# Patient Record
Sex: FEMALE | Race: OTHER | Employment: UNEMPLOYED | URBAN - METROPOLITAN AREA
[De-identification: names, ages, dates, MRNs, and addresses within clinical notes are randomized per-mention and may not be internally consistent; named-entity substitution may affect disease eponyms.]

---

## 2017-04-13 PROBLEM — E66.09 NON MORBID OBESITY DUE TO EXCESS CALORIES: Status: ACTIVE | Noted: 2017-04-13

## 2017-04-13 PROBLEM — F41.0 PANIC ATTACK: Status: ACTIVE | Noted: 2017-04-13

## 2017-04-13 PROBLEM — F41.8 ANXIETY WITH DEPRESSION: Status: ACTIVE | Noted: 2017-04-13

## 2017-04-13 PROBLEM — L25.9 CONTACT DERMATITIS, UNSPECIFIED CONTACT DERMATITIS TYPE, UNSPECIFIED TRIGGER: Status: ACTIVE | Noted: 2017-04-13

## 2017-04-14 PROBLEM — R79.89 ABNORMAL LFTS: Status: ACTIVE | Noted: 2017-04-14

## 2017-04-14 PROBLEM — E55.9 VITAMIN D DEFICIENCY: Status: ACTIVE | Noted: 2017-04-14

## 2020-04-07 PROBLEM — R87.612 PAPANICOLAOU SMEAR OF CERVIX WITH LOW GRADE SQUAMOUS INTRAEPITHELIAL LESION (LGSIL): Status: ACTIVE | Noted: 2020-04-07

## 2020-04-07 PROBLEM — O99.210 OBESITY AFFECTING PREGNANCY, ANTEPARTUM (HCC): Status: ACTIVE | Noted: 2020-04-07

## 2020-05-16 PROBLEM — Z34.90 PREGNANCY (HCC): Status: ACTIVE | Noted: 2020-05-16

## 2020-05-16 NOTE — PROGRESS NOTES
Pt is a 23year old female admitted to TRG4/TRG4-A, Patient presents with:  R/o Rom: Pt reports she notices leaking throughout the day. Pt is 39w0d intra-uterine pregnancy. Reports +fetal movement. History obtained, consents signed.  Oriented to room,

## 2020-05-16 NOTE — H&P
[address] and Delivery Prenatal History and Physical Interval Addendum  Please see full Prenatal Record for this pregnancy      SUBJECTIVE:    Interval History:      This is a pregnancy at 39 weeks admitted for SROM    OBJECTIVE:    The patient

## 2020-05-17 NOTE — PLAN OF CARE
Problem: Patient/Family Goals  Goal: Patient/Family Long Term Goal  Description  Patient's Long Term GoaL: Adequate pain relief    Interventions:  -  - See additional Care Plan goals for specific interventions   Outcome: Progressing  Goal: Patient/Family

## 2020-05-17 NOTE — L&D DELIVERY NOTE
Sherwin Diaz [[id number]]    Labor Events    Rupture date/time:  2020     Rupture type:  SROM  Fluid color:  Clear     Lacerations    No data filed      Schurz Presentation    No data filed      Operative Delivery    No data filed      Shoulder D viable male    Procedure:   Surgeon:Darlene  Anesthesia: epidural  Episiotomy or Laceration: none? Placenta and Cord:   Mechanism: spontaneous  Description: intact  Estimated Blood Loss: 300  Condition: stable      Patient presented with SROM.  Hernan Carrasco

## 2020-05-17 NOTE — PROGRESS NOTES
Patient  Getting a little more uncomfortable  SVE 2-3/80/-2 forebag ruptured   ContinuePitocin augmentation for SROM, epidural when ready

## 2020-05-17 NOTE — CM/SW NOTE
SW received an order for hx of postpartum and hx. Pt also has a hx of xanax and marijuana use. Informed RN she should consult the psych liaison. Will have BROCK BRYANT follow up tomorrow as pt is not being discharged.

## 2020-05-17 NOTE — ANESTHESIA PREPROCEDURE EVALUATION
PRE-OP EVALUATION    Patient Name: Torie Shea    Pre-op Diagnosis: * No pre-op diagnosis entered *    * No procedures listed *    * No surgeons found in log *    Pre-op vitals reviewed.   Temp: 97.9 °F (36.6 °C)  Pulse: 98  Resp: 18  BP: 123/85     Body Cardiovascular        Exercise tolerance: good     MET: >4    (+) obesity                                       Endo/Other                                  Pulmonary                           Neuro/Psych      (+) depression  (+) a

## 2020-05-17 NOTE — PROGRESS NOTES
Patient unable to void at this time. Patient transferred to mother/baby room 1112 per wheelchair in stable condition with baby and personal belongings. Accompanied by significant other and staff. Report given to mother/baby RN.

## 2020-05-17 NOTE — ANESTHESIA PROCEDURE NOTES
Labor Analgesia  Performed by: Matheus Webber MD  Authorized by: Matheus Webber MD       General Information and Staff    Start Time:  5/16/2020 9:13 PM  End Time:  5/16/2020 9:34 PM  Anesthesiologist:  Matheus Webber MD  Performed by:   Anesthesiolog

## 2020-05-17 NOTE — PROGRESS NOTES
[facility]  Post-Partum Vaginal Delivery Progress Note    Torie Shea Patient Status:  Inpatient    1995 MRN [id number]   [facility] 1SW-J Attending Carl Colon MD   Hosp Day # 1 PCP Mingo Proctor MD     SUBJECTIVE:

## 2020-05-18 NOTE — PROGRESS NOTES
Reviewed discharge instructions and resources available after discharge home,pt has a breast pump for home use, reviewed online support group for nurturing moms with clinical psychologist, Nicole Freitas, questions answered. Verbalized understanding.  Pt stat

## 2020-05-18 NOTE — CM/SW NOTE
SW spoke w/pt to address order. Pt stated she is not currently experiencing any anxiety or depression. Pt stated she was on anxiety medication but stopped taking it in January.  Pt stated she feels she has good family support and has everything she needs at

## 2020-05-18 NOTE — PROGRESS NOTES
[facility]  Post-Partum Vaginal Delivery Progress Note    Amalia Price Patient Status:  Inpatient    1995 MRN [id number]   [facility] 1SW-J Attending Jason Colon MD   Hosp Day # 2 PCP May Rosales MD     SUBJECTIVE:

## 2020-05-18 NOTE — PROGRESS NOTES
Pt has a hx of anxiety and depression and was given a Rx for xanax to use prn. Pt stated that she took medication only a few times. Pt stated that she is not seeing a psychologist for hx of anxiety and depression.  Discussed nurturing moms online support gr

## 2020-05-18 NOTE — PAYOR COMM NOTE
--------------  ADMISSION REVIEW     Payor: Ubaldo Dubois #:  [id number]  Authorization Number: [id number]         H&P - H&P Note             [address] and Delivery Prenatal History and Physical Interval hemostasis with Pitocin added to the IV and uterine massage.  Visual inspection revealed   Intact perineaum  The patient and infant were stable in the LDR  Britt BARLOW [facility]  5/17/2020  12:14 AM             5/17 OBGYN     Postpartum Day 1/2 s/p vaginal delive DAY:  docusate sodium (COLACE) cap 100 mg     Date Action Dose Route User    5/18/2020 0602 Given 100 mg Oral Dajuan Posada, RN    5/17/2020 1356 Given 100 mg Oral Tamy Koenig, RN      ibuprofen (MOTRIN) tab 600 mg     Date Action Dose Route User

## 2020-05-18 NOTE — PROGRESS NOTES
Labor Analgesia Follow Up Note    Patient underwent epidural anesthesia for labor analgesia,    Placenta Date/Time: 5/17/2020 12:07 AM    Delivery Date/Time[de-identified] 5/17/2020  12:04 AM    /82   Pulse 82   Temp 98.6 °F (37 °C) (Oral)   Resp 16   Wt 115.7 kg

## 2020-05-21 NOTE — PROGRESS NOTES
Reviewed self and infant care w / mom, she verbalizes understanding of instructions reviewed. Encourage to follow up w/ MDs as directed and w/ questions/concerns.  Hx of anx and depression, denies at this time but care reviweed and cc letter sent via my krishna

## 2020-12-03 PROBLEM — O99.210 OBESITY AFFECTING PREGNANCY, ANTEPARTUM (HCC): Status: RESOLVED | Noted: 2020-04-07 | Resolved: 2020-12-03

## 2020-12-03 PROBLEM — Z34.90 PREGNANCY (HCC): Status: RESOLVED | Noted: 2020-05-16 | Resolved: 2020-12-03

## 2023-06-03 NOTE — ED INITIAL ASSESSMENT (HPI)
Reports vag spotting for past 8 days with lower abd pain. On day 3 she took a preg test and today took another one and they were both negative. Was instructed to come to er by obgyn. Last tylenol for pain was thur.

## 2023-10-16 NOTE — TELEPHONE ENCOUNTER
Left message for patient to call and schedule appointment with the office, insurance information okay .

## 2023-10-24 NOTE — TELEPHONE ENCOUNTER
C/o bring pink spotting and cramps. New OB has not been seen in office advised to go to Immediate care. Agreed on POC. Patient verbalized understanding, agreed to and intend to comply with plan of care.

## 2023-10-24 NOTE — TELEPHONE ENCOUNTER
New pt, appt offered today to be seen in office. Pt request Friday appt. Appt 1015. Patient verbalized understanding, agreed to and intend to comply with plan of care.

## 2023-10-24 NOTE — ED INITIAL ASSESSMENT (HPI)
Pt states is 9 weeks pregnant, this morning noted discharge with spotting afterwards, also c/o lower abdominal cramping.  .

## 2023-10-27 NOTE — PROGRESS NOTES
Sergei Delaney is a 34year old female  Patient's last menstrual period was 2023 (approximate). Patient presents with:  New Patient  Ob Problem: Early pregnancy spotting, brown discharge on Tuesday, bleeding slowed, ER performed US and she is possibly miscarrying   . The ED US reports the following:   gestational sac with small fetal pole identified with mean gestational age by ultrasound at 6 weeks,  2 days +/- 3 days with expected delivery date of 2024 . No fetal heart tones are identified on current examination likely secondary to   size of fetal pole.   Ultrasound dates are discordant with gestational age by LMP of 9 weeks 0 days     OBSTETRICS HISTORY:  OB History    Para Term  AB Living   3 2 2     2   SAB IAB Ectopic Multiple Live Births           2      # Outcome Date GA Lbr Odell/2nd Weight Sex Delivery Anes PTL Lv   3 Current            2 Term 20 39w1d / 00:09 7 lb 3 oz (3.26 kg) M NORMAL SPONT EPI N RICH   1 Term 03/13/15   8 lb (3.629 kg) M Vag-Spont   RICH       GYNE HISTORY:    Sexual activity:   Yes      Partners:   Male                 MEDICAL HISTORY:  Past Medical History:   Diagnosis Date    Anxiety     Depression     LGSIL on Pap smear of cervix     Smoker        SURGICAL HISTORY:  Past Surgical History:   Procedure Laterality Date    Removal gallbladder         SOCIAL HISTORY:  Social History    Socioeconomic History      Marital status:       Spouse name: Not on file      Number of children: Not on file      Years of education: Not on file      Highest education level: Not on file    Occupational History      Not on file    Tobacco Use      Smoking status: Never      Smokeless tobacco: Never    Vaping Use      Vaping Use: Never used    Substance and Sexual Activity      Alcohol use: Not Currently        Comment: Not while pregnant       Drug use: Not Currently        Types: Cannabis        Comment: Former- 2019       Sexual activity: Yes Partners: Male    Other Topics      Concerns:        Not on file    Social History Narrative      Not on file    Social Determinants of Health  Financial Resource Strain: Not on file  Food Insecurity: Not on file  Transportation Needs: Not on file  Stress: Not on file  Housing Stability: Not on file    FAMILY HISTORY:  Family History   Problem Relation Age of Onset    Hypertension Father     Ovarian Cancer Mother     Diabetes Paternal Grandmother     Diabetes Paternal Grandfather     Asthma Sister        MEDICATIONS:    Current Outpatient Medications:     Prenatal Multivit-Min-Fe-FA (PRENATAL VITAMINS OR), Take by mouth., Disp: , Rfl:     ALLERGIES:    Dander                  HIVES    Comment:Cat and Dog Dander  Shrimp                  HIVES, WHEEZING      PHYSICAL EXAM:   Pelvic Exam:  External Genitalia: normal appearance, hair distribution, and no lesions  Urethral Meatus:  normal in size, location, without lesions and prolapse  Bladder:  No fullness, masses or tenderness  Vagina:  Normal appearance without lesions, no abnormal discharge, no blood pooling in vagina   Cervix:  Normal without tenderness on motion, no bleeding noted from cervix   Uterus: normal in size, contour, position, mobility, without tenderness  Adnexa: normal without masses or tenderness  Perineum: normal  Anus: no hemorroids     Assessment & Plan:  1. Bleeding in early pregnancy  - HCG, Beta Subunit, Quant; Future  - Progesterone; Future  - US OB 1st Trimester Abdominal <14 wks [42192];  Future      Discussed with patient to have US 7-10 days from previous

## 2023-11-02 NOTE — PROGRESS NOTES
Initial OB 7w3d    CELESTINO by 7w3d US does not correlate with LMP     OBHx:    , 8lb 0oz     7lb 3oz   -per patient no complications  PMHx; denies hepatitis, blood transfusion, HSV, or VTE  Last Pap 2023 normal results per pt -done at outside facility   PSHx: - cholecystectomy     Genetic screening discussed   -NIPT & Carrier - will consider next visit. Obesity (BMI 45)  -limit wt gain 11-20 lbs  -healthy eating and exercise discussed  -will plan L2US, growth US, and NSTs   -early 1 hr GTT, A1c,  CMP, uric acid - added to PNL       Vaginal spotting since 10/24/2023.  -progesterone 6.12 on 10/27  -is taking Prometrium 200 mg vaginally   -will plan to discontinue at 12 weeks    Constipation   -discussed stool softeners   -drink plenty of water, high fiber diet.

## 2023-11-28 NOTE — PROGRESS NOTES
ERIN - 11w1d     Pt still feeling pretty nauseous, food aversions, not too much vomiting. Dealing with it. Discussed unisom/B6, call if wants rx for antiemetic    CELESTINO by 7w3d US not consistent with LMP   -NIPT - wants, drawn  -carrier screen - wants, drawn    Obesity (BMI 45)  -limit wt gain 11-20 lbs  -healthy eating and exercise discussed  -will plan L2US, growth US, and NSTs   -early 1 hr GTT, A1c,  CMP, uric acid - added to PNL - reminded to have drawn      Constipation   -discussed stool softeners   -drink plenty of water, high fiber diet.

## 2023-11-28 NOTE — TELEPHONE ENCOUNTER
6  [place] pregnancy    Pt request for NIPS/carrier lab draw per Dr. April Woods     Patients name &  verified on lab tubes with patient. NIPS/carrier screen lab drawn, patient tolerated well. Specimen placed at . INVITAE access, call in 2/4 weeks for result, Cautioned patient may receive results via email from [facility] that includes gender results discussed. Patient verbalized understanding.

## 2023-12-26 PROBLEM — Z34.82 PRENATAL CARE, SUBSEQUENT PREGNANCY, SECOND TRIMESTER (HCC): Status: ACTIVE | Noted: 2023-12-26

## 2023-12-26 PROBLEM — O99.212 MATERNAL MORBID OBESITY IN SECOND TRIMESTER, ANTEPARTUM (HCC): Status: ACTIVE | Noted: 2023-12-26

## 2023-12-26 PROBLEM — O99.342 ANXIETY DURING PREGNANCY IN SECOND TRIMESTER, ANTEPARTUM (HCC): Status: ACTIVE | Noted: 2023-12-26

## 2023-12-26 PROBLEM — E66.01 MATERNAL MORBID OBESITY IN SECOND TRIMESTER, ANTEPARTUM (HCC): Status: ACTIVE | Noted: 2023-12-26

## 2023-12-26 PROBLEM — F41.9 ANXIETY DURING PREGNANCY IN SECOND TRIMESTER, ANTEPARTUM (HCC): Status: ACTIVE | Noted: 2023-12-26

## 2023-12-26 PROBLEM — O21.9 NAUSEA/VOMITING IN PREGNANCY (HCC): Status: ACTIVE | Noted: 2023-12-26

## 2023-12-26 PROBLEM — F43.9 STRESS: Status: ACTIVE | Noted: 2023-12-26

## 2023-12-26 NOTE — PROGRESS NOTES
ERIN     Partner here     Smoothies - strawberry, banana, spinach & kale, pineapple help with the nausea. Has NOT been adding protein powder   Not really eating solid food     The nausea is better. Vomited only 2 times last week. +FM. Occasional low stretching/pulling. Did slip and fell on some carpeted stairs on 23. Missed a step. Did not hurt self. 34year old  at 15w1d   CELESTINO by [address] not consistent with LMP   O+    -NIPS neg, GIRL   -carrier screen neg  -AFP desired. Ordered   -Flu  - 2023   - COVID booster encouraged     Morbid obesity (BMI 45)  -limit wt gain 11-20 lb discussed    -healthy eating and exercise discussed  -will plan L2US, growth US, and NSTs   -early 1 hr GTT, A1c, CMP, uric acid - added to PNL - reminded to have drawn. Not done yet. Anxiety   -meds: on & off since 1st baby born. Started in postpartum period. Took Xanax for panic attacks. Thinks citalopram, sertraline in the past. On & off. None for about 2 years  -therapist - used to see someone, but insurance changed. [address] referral   -some stressors in life, changes    NVP  -improving.  No meds  -encouraged adding in protein, at least  grams per day    Constipation   -improved with improving nausea, eating apples    RTC 4 wk

## 2023-12-26 NOTE — PATIENT INSTRUCTIONS
Please have labs done ASAP. Please schedule 20 wk ultrasound with Maternal Fetal Medicine (MFM)  Walter Del Cid   [facility]  [place] [address], [address]    [address] [place], [address] [facility], [address] [place] [address], [facility] [address]   [phone]       AFP Level   There is an optional blood test that we can perform on you called \"AFP level. \" It is a chemical in the bloodstream produced by the pregnancy. It is done between 15-20 weeks gestation. The ideal time for testing is actually 16-18 weeks gestation. If the level is abnormally elevated, this can indicate the presence of abnormalities of the development of the brain and spinal cord such as anencephaly (lack of brain development) and spina bifida (exposed spinal cord). These anomalies can generally be seen on ultrasound. It can also be elevated in cases of normal fetal anatomy and can indicate an abnormal placenta. This may or may not be able to be detected on ultrasound. Please think about whether or not you would like to have this test done. When you decide when you would like to have this test done, please let us know. We must enter your exact gestational age and weight on the date of the blood draw for the test to be calculated accurately. Controlling weight gain in pregnancy:  Look at current diet - write down everything you eat for a few days. Count up your mg protein, grams of fiber, etc.   Protein - aim for 75 grams per day. [email or web address]. Add (plain- no herbal additives, etc) protein powder if needed. Aim for 1 gallon of water daily   Fiber rich foods. 25-28 grams per day.    Heartburn medication if needed   Avoid fast/simple carbohydrates (sodas) - this can spike your insulin levels & can trigger hypoglycemia which can cause ravenous hunger  Adequate sleep important for hormonal regulation of appetite. Avoid high sodium foods. Aim for potassium-rich foods. These will have a slight diuretic effect. Walk for at least 15 minutes after every meal.     Basic sample meal plan:    Breakfast: 15-30 g carbs for breakfast  Mid morning snack (if hungry): 15 -30 g carb   Lunch: 45-60 g carb  Mid afternoon snack (if hungry): 15-30 g carb  Dinner: 45-60 g carb   Bedtime snack if hungry can be 15 g carb with some protein.   Snack should be between 8-10 pm

## 2024-01-04 NOTE — PROGRESS NOTES
ERIN, problem visit    Chief Complaint   Patient presents with    Prenatal Care     ERIN- 16w 3d    Patient reports having discharge that started 2 weeks ago that was bright green, Today patient reports it has decrease but still bright green.       Spec exam - slightly greenish tinged mucusy discharge - vaginosis swab & GC/CT done       28 year old  at 16w3d   CELESTINO by 7w3d US not consistent with LMP   O+    -NIPS neg, GIRL   -carrier screen neg  -AFP desired. Ordered   -Flu - - 2023   - COVID booster encouraged     Morbid obesity (BMI 45)  -limit wt gain 11-20 lb discussed    -healthy eating and exercise discussed  -L2US scheduled  -early 1 hr GTT, A1c, CMP, uric acid - added to PNL - reminded to have drawn, going later today  -growth US, NSTs per MFM    Anxiety   -meds: on & off since 1st baby born. Started in postpartum period. Took Xanax for panic attacks. Thinks citalopram, sertraline in the past. On & off. None for about 2 years  -therapist - used to see someone, but insurance changed.  referral   -some stressors in life, changes

## 2024-01-10 NOTE — TELEPHONE ENCOUNTER
Pt calling to speak to a nurse about her symptoms. Pt states she is 17wk pregnant and has had a migraine for 48 hours. Pt states she is now starting to feel dizzy too. Please advise.    Improved.

## 2024-01-10 NOTE — TELEPHONE ENCOUNTER
Reason 17 2/7 wk c/o migraine for 48 h,,now starting to feel dizzy & vomiting   Last seen  1/4/24   History  Lives with obesity & anxiety, antibody screen positive   Onset Migraine Yesterday   Assessment Cold s/s Runny nose started today, vomiting since 0600 x 3, denies hx migraine, allg. Starting to eat now. Headache is frontal radiates all the way to back of her head and neck,  - Took Tylenol ES 2 tablet.  Denies swelling, epigastric pain, neck issues.    Plan  Try Ice pack on her head, low stimuli, dark room, Tylenol 1000 mg with Benadryl 25 mg, Could be stress related, cold. If s/s persist to go to ER.   Follow up Appointment  1/23/24   Patient verbalized understanding, agreed to and intend to comply with plan of care.

## 2024-01-11 PROBLEM — D50.9 IRON DEFICIENCY ANEMIA DURING PREGNANCY (HCC): Status: ACTIVE | Noted: 2024-01-11

## 2024-01-11 PROBLEM — E87.6 HYPOKALEMIA: Status: ACTIVE | Noted: 2024-01-06

## 2024-01-11 PROBLEM — O99.019 IRON DEFICIENCY ANEMIA DURING PREGNANCY (HCC): Status: ACTIVE | Noted: 2024-01-11

## 2024-01-11 PROBLEM — O24.410 DIET CONTROLLED GESTATIONAL DIABETES MELLITUS (GDM) IN SECOND TRIMESTER (HCC): Status: ACTIVE | Noted: 2024-01-06

## 2024-01-11 PROBLEM — O24.410 DIET CONTROLLED GESTATIONAL DIABETES MELLITUS (GDM) IN SECOND TRIMESTER (HCC): Status: ACTIVE | Noted: 2024-01-11

## 2024-01-17 NOTE — TELEPHONE ENCOUNTER
1/18 LVM with pt to reschedule her GDM apt that patient canceled on 1/17 1/17 Pt canceled her gdm apt with cal on 1/17 LVM to reschedule apt.

## 2024-01-18 NOTE — TELEPHONE ENCOUNTER
1/18/2024 lvm to reschedule apt for gdm apt that patient has referral for  with cal that patient canceled

## 2024-01-23 NOTE — PROGRESS NOTES
ERIN 19w1d    She is doing well, no complaints.   -had appointment with DM educator due to on 1/31- A1c 5.7%.    CELESTINO by 7w3d US not consistent with LMP   O+     -NIPS neg, GIRL   -carrier screen neg  -AFP desired, ordered at prior visit   -Flu done  - 12/26/2023      Morbid obesity (BMI 45)  -limit wt gain 11-20 lb discussed    -healthy eating and exercise discussed  -will plan L2US, growth US, and NSTs   - CMP, uric acid - added to PNL - reminded to have drawn. Not done yet.     Early onset GDM   -at 17 weeks A1c 5.7%, 1 hr   - DM education recommended - has appointments scheduled.      Anxiety   -meds: on & off since 1st baby born. Started in postpartum period. Took Xanax for panic attacks. Thinks citalopram, sertraline in the past. On & off. None for about 2 years  -therapist - used to see someone, but insurance changed.  referral placed at prior visit

## 2024-01-30 PROBLEM — E66.01 MORBID OBESITY WITH BMI OF 45.0-49.9, ADULT (HCC): Status: ACTIVE | Noted: 2023-12-26

## 2024-01-30 NOTE — PROGRESS NOTES
Outpatient Maternal-Fetal Medicine Consultation    Dear Dr. Kelley,    Thank you for requesting ultrasound evaluation and maternal fetal medicine consultation on your patient Tatiana Voss.  As you are aware she is a 28 year old female with a Fitch pregnancy at 20w1d.  A maternal-fetal medicine consultation was requested secondary to maternal morbid obesity complicating pregnancy and mildly elevated hemoglobin A1c.  Her prenatal records and labs were reviewed.    Her prenatal labs were reviewed.  Her TSH and CMP were unremarkable for pregnancy.  Her hemoglobin A1c was 5.7%.  She passed a 1 hour glucose tolerance test.  Her TSH was within normal limits.  Her random urine protein creatinine ratio 2 little protein to calculate.  She had a low risk maternal serum AFP for open neural tube defect.  She also had a low probability cell free DNA screen.    HISTORY  OB History    Para Term  AB Living   3 2 2 0 0 2   SAB IAB Ectopic Multiple Live Births   0 0 0 0 2   Obstetric Comments   Current - morbid obesity, early onset GDM dx 16 wk, h/o elevated LFTs (baseline LFTs normal at 16 wk), anxiety, depression, panic attacks      # 1 - Date: 03/13/15, Sex: Male, Weight: 8 lb (3.629 kg), GA: None, Delivery: Vaginal, Spontaneous, Apgar1: None, Apgar5: None, Living: Living, Birth Comments: None    # 2 - Date: 20, Sex: Male, Weight: 7 lb 3 oz (3.26 kg), GA: 39w1d, Delivery: Normal spontaneous vaginal delivery, Apgar1: 9, Apgar5: 9, Living: Living, Birth Comments: None    # 3 - Date: None, Sex: None, Weight: None, GA: None, Delivery: None, Apgar1: None, Apgar5: None, Living: None, Birth Comments: None    Past Medical History  The patient  has a past medical history of Abnormal LFTs (2017), Anemia (), Anxiety, Anxiety with depression (2017), Contact dermatitis, unspecified contact dermatitis type, unspecified trigger (2017), Depression, Gestational diabetes (2024), LGSIL on Pap  smear of cervix, Morbid obesity with BMI of 40.0-44.9, adult (HCC) (2023), Morbid obesity with BMI of 45.0-49.9, adult (HCC), Panic attack (04/13/2017), Screening for genetic disease carrier status (12/05/2023), Smoker, and Vitamin D deficiency (04/14/2017).    Past Surgical History  The patient  has a past surgical history that includes removal gallbladder (01/01/2012) and cholecystectomy (2012).    Family History  The patient She indicated that her mother is alive. She indicated that her father is alive. She indicated that the status of her sister is unknown. She indicated that the status of her paternal grandmother is unknown. She indicated that the status of her paternal grandfather is unknown. She indicated that the status of her neg is unknown. She indicated that her maternal aunt is alive.      Medications:   Current Outpatient Medications:     Ferrous Gluconate (IRON) 240 (27 Fe) MG Oral Tab, 3 (three) times a week., Disp: , Rfl:     Cholecalciferol (D3 VITAMIN OR), , Disp: , Rfl:     Magnesium 500 MG Oral Tab, Take by mouth daily., Disp: , Rfl:     Prenatal Multivit-Min-Fe-FA (PRENATAL VITAMINS OR), Take by mouth., Disp: , Rfl:     ergocalciferol 1.25 MG (30825 UT) Oral Cap, Take 1 capsule (50,000 Units total) by mouth As Directed., Disp: , Rfl:   Allergies:   Allergies   Allergen Reactions    Cat Hair Extract HIVES, Runny nose and UNKNOWN    Dander HIVES     Cat and Dog Dander    Shellfish Allergy ANXIETY, Coughing, OTHER (SEE COMMENTS), HIVES, RASH and SHORTNESS OF BREATH    Shrimp HIVES and WHEEZING    Shrimp Extract Allergy Skin Test ANAPHYLAXIS, HIVES and SHORTNESS OF BREATH         PHYSICAL EXAMINATION:  /71 (BP Location: Right arm, Patient Position: Sitting, Cuff Size: adult)   Pulse 88   Ht 5' 5.5\" (1.664 m)   Wt 268 lb (121.6 kg)   LMP 08/27/2023 (Approximate)   BMI 43.92 kg/m²   General: alert and oriented,no acute distress  Abdomen: gravid, soft, non-tender  Extremities: non-tender, no  edema      OBSTETRIC ULTRASOUND  The patient had a level 2 ultrasound today which I interpreted the results and reviewed them with the patient.    Ultrasound findings:   Single IUP in breech presentation.    Placenta is anterior.   A 3 vessel cord is noted.  Cardiac activity is present at 158 bpm   g ( 0 lb 14 oz);   MVP is 3.2 cm  Normal appearing uterus and adnexa.  Ovaries not visualized but no adnexal masses    The cervix was not able to be clearly visualized and appeared short by transabdominal ultrasound, therefore transvaginal ultrasound was performed. Transvaginal US findings: Cervix is closed, long and no funneling seen measuring 37.4 mm     The fetal measurements are consistent with the established EDC. No ultrasound evidence of structural abnormalities are seen today. The nasal bone is present. No ultrasound evidence of markers for aneuploidy are seen. She understands that ultrasound exam cannot exclude genetic abnormalities and that genetic testing is recommended. The limitations of ultrasound were discussed.    See imaging tab for the complete US report.    DISCUSSION  During her visit we discussed and reviewed the following issues:  OBESITY:  Her BMI prior to pregnancy was 45  Obesity during pregnancy is associated with numerous maternal and  risks.  It is not clear whether obesity is a direct cause of adverse pregnancy outcome or whether the association between obesity and adverse pregnancy outcome is due to factors such as diabetes mellitus.   Data suggest that obese women should be encouraged to undertake a weight reduction program (diet, exercise, behavior modification, and possibly bariatric surgery in some cases) prior to attempting to conceive.            Subfertility in obese women is most commonly related to ovulatory dysfunction, and, in some obese women, the ovulatory dysfunction is related to polycystic ovary syndrome (PCOS). It is also important to note that even among  ovulatory women, increasing obesity is associated with decreasing spontaneous pregnancy rates.  The increased risk of miscarriage in obese women may be because such women often have PCOS or isolated insulin resistance.                 Due to its strong association with obesity in the general population, type 2 diabetes mellitus is one of the two most common medical complications of the obese . The increased risk of type 2 diabetes is primarily related to an exaggerated increase in insulin resistance in the obese state. It is reasonable to screen obese gravidas for undiagnosed pregestational diabetes in the first trimester.   Glucose intolerance associated with gestational diabetes generally resolves postpartum; however, obese women with a history of gestational diabetes have a two-fold increased prevalence of subsequent type 2 diabetes.           An association between obesity and hypertensive disorders during pregnancy has been consistently reported.  In particular, maternal weight and BMI are independent risk factors for preeclampsia.             Studies have found that the increased risk of  birth in obese gravidas is primarily associated with obesity-related medical and  complications, rather than an intrinsic predisposition to spontaneous  birth. Prevention of  birth in these patients, therefore, should be directed toward prevention or management of medical and obstetrical complications.               Both prepregnancy obesity and excessive maternal weight gain before or during pregnancy contribute to an increased probability of  delivery.  It has also been hypothesized that obesity may lead to dystocia due to increased soft tissue deposition in the maternal pelvis.    delivery in the obese  is associated with numerous perioperative concerns, including emergency delivery, prolonged incision to delivery interval, blood loss >1000 mL, longer operative  times, wound infection, thromboembolism, and endometritis.            Maternal obesity appears to be associated with a small increase in the absolute rate of some congenital anomalies (primarily neural tube defect and cardiac), and the risk may increase with increasing maternal weight.  Level II ultrasound is advised for women with obesity.  The risk of neural tube defects increased significantly with maternal weight.    The analysis found that overweight and obese pregnant women experienced significantly more stillbirths than normal weight women.  Third trimester  testing is advised.    We discussed the current recommendations for limited gestational weight gain in pregnancy for overweight and obese women.  The [place] [employer] currently recommends that women keep gestational weight gain to between 8-18 lbs.  We discussed the role of mild to moderate exercise, healthy food choices and appropriate portions sized to help achieve this goal.  Excess weight gain is associated with higher rates of gestational diabetes, hypertensive complications, fetal macrosomia and delivery complications.  Women with weight loss or insufficient weight gain have higher rates of small for gestational age infants.    A recent study found that initiating moderate exercise in early pregnancy for obese gravidas significantly reduced the incidence of gestational diabetes, gestational hypertension,  deliveries and C-sections.  In the study, women were assigned to riding a stationary cycle for  30 minutes 3 times per week, keeping HR<140 bpm.  I encouraged Tatiana to begin moderate exercise such as walking or stationary bike in the pregnancy.    Obstructive sleep apnea (KENNY), which refers to apnea (absent or severely reduced airflow) during sleep despite respiratory effort. KENNY is characterized by repetitive partial or complete episodes of upper airway obstruction during sleep. The obstruction results in a reduction of  airflow, hypoxemia, sympathetic discharge, and recurrent arousals from sleep.     The numerous hormonal and physiologic changes occurring in pregnancy may play a role in the prevalence and severity of KENNY among pregnant women: oropharyngeal diameter appears to narrow, Nasal patency is reduced during pregnancy secondary to hyperemia and edema of the nasal mucosa, maternal blood volume increases, on average, 40 to 45 percent so recumbency may adversely affect upper airway function and possibly increase the likelihood of sleep-disordered breathing, and increase in progesterone levels during pregnancy leads to increased tidal volume and results in increased minute ventilation.    Sleep-disordered breathing has been proposed as a risk factor for adverse maternal-fetal outcomes, including preeclampsia and small-for-gestational age births. Individuals with KENNY have an increased risk of developing type II diabetes, hyperinsulinemia, and metabolic syndrome. There are no data on the effect of KENNY on risk of intrauterine fetal demise.    I would recommend treating all pregnant women with moderate or severe KENNY by apnea hypopnea  index (AHI) criteria. In the general population, therapies for KENNY include continuous positive airway pressure (CPAP), oral appliances, upper airway surgery, and behavior modification. CPAP is generally regarded as first-line therapy for pregnant women with KENNY. Weight loss, which has been demonstrated to improve comorbid conditions and decrease the overall AHI, is not recommended in pregnancy.  However, limiting weight gain may benefit an individual’s global risk for pregnancy related morbidity.    Women with known KENNY who become pregnant should be evaluated and followed by a sleep medicine provider.   The anesthesia service is consulted prenatally, but at least early in the patient’s labor. Early placement of regional anesthesia may prevent the need for general anesthesia if emergent   delivery becomes necessary. Women who have been using CPAP, another positive pressure modality, or an oral  appliance prior to delivery for KENNY are instructed to bring their devices when they present for delivery.    Patients with KENNY should have their oxygenation continuously monitored during labor with a pulse oximeter.      We discussed the recommended plan of care based on her  risk factors.  Tatiana and her significant other, Mati, had their questions answered to their satisfaction.      IMPRESSION:  IUP at 20w1d  Normal level 2 ultrasound; ductal arch view was slightly suboptimal but appears normal  Suspected short cervix not found, normal transvaginal cervical length  Maternal morbid obesity complicating pregnancy  Obstructive sleep apnea    RECOMMENDATIONS:  Continue care with Dr. Kelley  11-20 lb weight gain for pregnancy  Monthly growth ultrasounds starting at 28 weeks, add BPP to each growth ultrasound at 32 weeks and beyond  Weekly NSTs at 34 weeks  Delivery 39-40 weeks  Daily low-dose aspirin was advised    Total time spent 55 minutes this calendar day which includes preparing to see the patient including chart review, obtaining and/or reviewing additional medical history, performing a physical exam and evaluation, documenting clinical information in the electronic medical record, independently interpreting results, counseling the patient, communicating results to the patient/family/caregiver and coordinating care.     Case discussed with patient who demonstrated understanding and agreement with plan.     Thank you for allowing me to participate in the care of this patient.  Please feel free to contact me with any questions.    Maria Guadalupe Thakkar MD  Maternal-Fetal Medicine       Note to patient and family:  The 21st Century Cures Act makes medical notes available to patients in the interest of transparency.  However, please be advised that this is a medical document.  It is intended as a peer to  peer communication.  It is written in medical language and may contain abbreviations or verbiage that are technical and unfamiliar.  It may appear blunt or direct.  Medical documents are intended to carry relevant information, facts as evident, and the clinical opinion of the practitioner.

## 2024-01-31 NOTE — PROGRESS NOTES
Tatiana Voss  :1995 was seen for Gestational Diabetes Counseling: Individual/Group    Date: 2024  Referring Provider: Dr. 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 & jelly, to go jerky & cheese & skinny pop , string cheese   [place]: may be skinny pop or an apple   D: 5-6pm grd beef, chx breast, hot dogs, tomatoes potatoes, frozen veggies w/grd beef & white rice (avoids pasta )  Snack: mandarin oranges  Water, no milk ,no coffee, no sodas- sweet tea   1x/month      Exercise: none     Education:     GDM Overview:  Reviewed gestational diabetes as diagnosis including target blood glucose values.  Benefits, risks, and management options for improving/maintaining glucose control to mother/baby discussed.    Healthy Eating:  Discussed nutrition concepts for pregnancy/healthy eating and effects of food on BG value.  Timing of meals; what is a carbohydrate, protein, fat.  Taught: Carb counting, label reading, meal planning.  Suggested Calorie Level: 2000    Being Active:  Benefits and effects of activity on BG discussed.  Reviewed types of recommended activity, duration, precautions, and when to call MD.    Monitoring:  Instructed on how to use glucose monitor/proper lancet disposal. Testing schedules are:   Fastin-95 mg/dL, Call MD is >105 md/dL twice in 1 week   2 Hour Post Prandial:  120 md/dL, Call MD if >140 md/dL twice in 1 week.    Instructed /demonstrated ability to perform blood glucose testing on: Onetouch Verio Flex  Prescription sent to pharmacy  Discussed monitoring ketones.    Taking Medication:  Reviewed when  medication might be indicated.    Reducing Risk:  Effects of elevated blood glucose on mother/baby reviewed.  Discussed management (hyperglycemia, hypoglycemia, sick day, other) and when to call provider.  Post pregnancy management/prevention of Type 2 DM, and increased risk of having diabetes later in life reviewed.    Training Tools Provided:  [place] Diabetes and Pregnancy: A guide to self management  BG Log Sheets    Recommendations:      1. Follow recommended meal plan.   2. Begin testing fasting glucose and 2 hour after meals   3. Bring glucose / food log to next MD office visit.   4. Encouraged activity if no restrictions.   5. Encouraged Tatiana to call diabetes center with any questions or concerns.   6. If fasting glucose is above 105 mg/dL or after meal above 140 mg/dL for 2 days in a row please call your OB    7. Follow-up in 2 wks    Patient verbalized understanding and has no further questions at this time.    Magali Perez RN, [facility] [place]

## 2024-02-23 NOTE — PROGRESS NOTES
Patient has no compaitns.    CELESTINO by 7w3d US not consistent with LMP     -NIPS neg, GIRL   -carrier screen neg  -Flu done  - 12/26/2023      Morbid obesity (BMI 45)  -limit wt gain 11-20 lb discussed    -healthy eating and exercise discussed  - normal L2U, f/u 28 and 32 weeks  - NST 4 weeks  - CMP low potasium - corrected    Early onset GDM - soft call   -at 17 weeks A1c 5.7%, 1 hr   - DM education done, patient does not agree that she has DM since she passed 1GTT, not checking her blood sugars as it is an expense to get the machine, but she did adjust her diet  - patient will repeat 1 GTT before next visit     Anxiety   -meds: on & off since 1st baby born. Started in postpartum period. Took Xanax for panic attacks. Thinks citalopram, sertraline in the past. On & off. None for about 2 years  -therapist - used to see someone, but insurance changed.  referral placed at prior visit

## 2024-03-21 NOTE — PROGRESS NOTES
Denies LOF, VB, ctx/cramping  +FM  Feels more pressure and occasional B-H contractions    live  5 yo Elias       CELESTINO by 7w3d US not consistent with LMP     -NIPS neg, GIRL   -carrier screen neg  -Flu done  - 12/26/2023   -Tdap 3/21/24     Morbid obesity (BMI 45)  -limit wt gain 11-20 lb discussed    -healthy eating and exercise discussed  - normal L2U, f/u 28 and 32 weeks  - NST 4 weeks  - CMP low potasium - corrected    Does not have Early onset GDM   -at 17 weeks A1c 5.7%, 1 hr   - DM education done, patient does not agree that she has DM since she passed 1GTT, not checking her blood sugars as it is an expense to get the machine, but she did adjust her diet  - 1h GTT is n117    Anxiety   -meds: on & off since 1st baby born. Started in postpartum period. Took Xanax for panic attacks. Thinks citalopram, sertraline in the past. On & off. None for about 2 years  -therapist - used to see someone, but insurance changed.  referral placed at prior visit     Iron def anemia  #Anemia of Pregnancy  Iron every other day with beverage/food w vitamin C

## 2024-03-26 NOTE — PROGRESS NOTES
Outpatient Maternal-Fetal Medicine Follow-Up     Dear Dr. Kelley,     Thank you for requesting ultrasound evaluation and maternal fetal medicine consultation on your patient Tatiana Voss.  As you are aware she is a 26/27 year old female  with a Fitch pregnancy and an Estimated Date of Delivery: 24.  She returned to maternal-fetal medicine today for a follow-up visit.  Her history was reviewed from her prior visit and there were no interval changes.    She passed her third trimester GDM screen     Antepartum Risk Factors  Class III obesity  History of GDM  Obstructive sleep apnea    S: She reports good fetal movement.  She is exercising more regularly in this pregnancy.  She did not start the low-dose aspirin.  She did not think she was supposed to start that until 30 weeks.    PHYSICAL EXAMINATION:  /74 (BP Location: Right arm, Patient Position: Sitting, Cuff Size: large)   Pulse 102   Ht 5' 5.5\" (1.664 m)   Wt 263 lb (119.3 kg)   LMP 2023 (Approximate)   BMI 43.10 kg/m²   General: alert and oriented, no acute distress  Abdomen: gravid, soft, non-tender  Extremities: non-tender, no edema     OBSTETRIC ULTRASOUND  The patient had a fetal growth ultrasound today which I interpreted the results and reviewed them with the patient.    Ultrasound Findings:  Single IUP in cephalic presentation.    Placenta is anterior.   Cardiac activity is present at 155 bpm  EFW 1185 g ( 2 lb 10 oz); 52%.    MVP is 3.7 cm . DIONY 12.8 cm    The fetal measurements are consistent with established EDC. No gross ultrasound evidence of structural abnormalities are seen today. The patient understands that ultrasound cannot rule out all structural and chromosomal abnormalities.     See imaging tab for the complete US report.     DISCUSSION  During her visit we discussed and reviewed the following issues:  OBESITY:  Her BMI prior to pregnancy was 45  Obesity during pregnancy is associated with numerous  maternal and  risks which was discussed previously and reviewed.  See MFM note from 2024 for detailed review.     Obstructive sleep apnea (KENNY) and potential pregnancy complications were discussed previously and reviewed.  See M note from 2024 for detailed review.        We discussed the recommended plan of care based on her  risk factors.  Tatiana and her significant other, Mati, had their questions answered to their satisfaction.        IMPRESSION:  IUP at 28w1d  Normal fetal growth and activity  Maternal morbid obesity complicating pregnancy  Obstructive sleep apnea     RECOMMENDATIONS:  Continue care with Dr. Kelley  11-20 lb weight gain for pregnancy  Monthly growth & BPP ultrasound  Weekly NSTs at 34 weeks  Delivery 39-40 weeks  Daily low-dose aspirin was advised        Total time spent 30 minutes this calendar day which includes preparing to see the patient including chart review, obtaining and/or reviewing additional medical history, performing a physical exam and evaluation, documenting clinical information in the electronic medical record, independently interpreting results, counseling the patient, communicating results to the patient/family/caregiver and coordinating care.     Case discussed with patient who demonstrated understanding and agreement with plan.     Thank you for allowing me to participate in the care of this patient.  Please feel free to contact me with any questions.    Maria Guadalupe Thakkar MD  Maternal-Fetal Medicine       Note to patient and family:  The 21st Century Cures Act makes medical notes available to patients in the interest of transparency.  However, please be advised that this is a medical document.  It is intended as a peer to peer communication.  It is written in medical language and may contain abbreviations or verbiage that are technical and unfamiliar.  It may appear blunt or direct.  Medical documents are intended to carry relevant information,  facts as evident, and the clinical opinion of the practitioner.

## 2024-04-11 NOTE — PROGRESS NOTES
ERIN 30w3d    She is feeling abdominal heaviness - reassured patient, belly band discussed.       CELESTINO by 7w3d US not consistent with LMP      -NIPS neg, GIRL   -carrier screen neg  -Flu done  - 12/26/2023   -Tdap 3/21/24     Morbid obesity (BMI 45)  -limit wt gain 11-20 lb discussed    -healthy eating and exercise discussed  - normal L2U, f/u 28 and 32 weeks      28 wk EFW 1185 g (2 lb 10 oz) 52%  - NST 34 weeks  - CMP low potasium - corrected  -Delivery at 39-40 wks  -Daily ASA advised - is not currently taking - encouraged      Does not have Early onset GDM   -at 17 weeks A1c 5.7%, 1 hr   - DM education done, patient does not agree that she has DM since she passed 1GTT, not checking her blood sugars as it is an expense to get the machine, but she did adjust her diet  - 3/16/24 1 hr GTT -117     Anxiety   -meds: on & off since 1st baby born. Started in postpartum period. Took Xanax for panic attacks. Thinks citalopram, sertraline in the past. On & off. None for about 2 years  -therapist - used to see someone, but insurance changed.  referral placed at prior visit      Iron def anemia  Iron every other day with beverage/food w vitamin C- Is doing

## 2024-04-23 NOTE — PROGRESS NOTES
Outpatient Maternal-Fetal Medicine Follow-Up     Dear Dr. Kelley,     Thank you for requesting ultrasound evaluation and maternal fetal medicine consultation on your patient Tatiana Voss.  As you are aware she is a 26/27 year old female  with a Fitch pregnancy and an Estimated Date of Delivery: 24.  She returned to maternal-fetal medicine today for a follow-up visit.  Her history was reviewed from her prior visit and there were no interval changes.    She passed her third trimester GDM screen     Antepartum Risk Factors  Class III obesity  History of GDM  Obstructive sleep apnea    S: She reports good fetal movement.  She is exercising more regularly in this pregnancy.  Typical MS discomforts of pregnancy but no concerns    PHYSICAL EXAMINATION:  /77 (BP Location: Right arm, Patient Position: Sitting, Cuff Size: large)   Pulse 90   Ht 5' 5.5\" (1.664 m)   Wt 267 lb (121.1 kg)   LMP 2023 (Approximate)   BMI 43.76 kg/m²   General: alert and oriented, no acute distress  Abdomen: gravid, soft, non-tender  Extremities: non-tender, no edema     OBSTETRIC ULTRASOUND  The patient had a fetal growth & BPP ultrasound today which I interpreted the results and reviewed them with the patient.    Ultrasound Findings:    Single IUP in cephalic presentation.    Placenta is anterior.   A 3 vessel cord is noted.  Cardiac activity is present at 141 bpm  EFW 1947 g ( 4 lb 5 oz); 49%.    DIONY is  11.2 cm.  MVP is 4.3 cm  BPP is 8/8.     The fetal measurements are consistent with established EDC. No gross ultrasound evidence of structural abnormalities are seen today. The patient understands that ultrasound cannot rule out all structural and chromosomal abnormalities.     See imaging tab for the complete US report.     DISCUSSION  During her visit we discussed and reviewed the following issues:  OBESITY:  Her BMI prior to pregnancy was 45  Obesity during pregnancy is associated with numerous maternal  and  risks which was discussed previously and reviewed.  See M note from 2024 for detailed review.     Obstructive sleep apnea (KENNY) and potential pregnancy complications were discussed previously and reviewed.  See [facility] note from 2024 for detailed review.        We discussed the recommended plan of care based on her  risk factors.  Tatiana and her significant other, Mati, had their questions answered to their satisfaction.        IMPRESSION:  IUP at 32w1d  Normal fetal growth and  testing  Maternal morbid obesity complicating pregnancy  Obstructive sleep apnea     RECOMMENDATIONS:  Continue care with Dr. Kelley  11-20 lb weight gain for pregnancy  Monthly growth & BPP ultrasound  Weekly NSTs at 34 weeks  Delivery 39-40 weeks  Daily low-dose aspirin was advised        Total time spent 20 minutes this calendar day which includes preparing to see the patient including chart review, obtaining and/or reviewing additional medical history, performing a physical exam and evaluation, documenting clinical information in the electronic medical record, independently interpreting results, counseling the patient, communicating results to the patient/family/caregiver and coordinating care.     Case discussed with patient who demonstrated understanding and agreement with plan.     Thank you for allowing me to participate in the care of this patient.  Please feel free to contact me with any questions.    Maria Guadalupe Thakkar MD  Maternal-Fetal Medicine       Note to patient and family:  The 21st Century Cures Act makes medical notes available to patients in the interest of transparency.  However, please be advised that this is a medical document.  It is intended as a peer to peer communication.  It is written in medical language and may contain abbreviations or verbiage that are technical and unfamiliar.  It may appear blunt or direct.  Medical documents are intended to carry relevant information,  facts as evident, and the clinical opinion of the practitioner.     no

## 2024-04-26 NOTE — PROGRESS NOTES
Patient c/o lower back pain, worse when laying down      CELESTINO by 7w3d US not consistent with LMP      -NIPS neg, GIRL   -carrier screen neg  -Flu done  - 12/26/2023   -Tdap 3/21/24     Morbid obesity (BMI 45)  -limit wt gain 11-20 lb discussed    -healthy eating and exercise discussed  - normal L2U, f/u 28 and 32 weeks normal, continue monthly per MFM      - NST 34 weeks  - CMP low potasium - corrected  -Delivery at 39-40 wks  -Daily ASA taking     Does not have Early onset GDM   -at 17 weeks A1c 5.7%, 1 hr   - DM education done, patient does not agree that she has DM since she passed 1GTT, not checking her blood sugars as it is an expense to get the machine, but she did adjust her diet  - 3/16/24 1 hr GTT -117     Anxiety   -meds: on & off since 1st baby born. Started in postpartum period. Took Xanax for panic attacks. Thinks citalopram, sertraline in the past. On & off. None for about 2 years  -therapist - used to see someone, but insurance changed.  referral placed at prior visit      Iron def anemia  Iron every other day with beverage/food w vitamin C- Is doing

## 2024-05-09 PROBLEM — Z34.83 PRENATAL CARE, SUBSEQUENT PREGNANCY IN THIRD TRIMESTER (HCC): Status: ACTIVE | Noted: 2023-12-26

## 2024-05-09 PROBLEM — O21.9 NAUSEA/VOMITING IN PREGNANCY (HCC): Status: RESOLVED | Noted: 2023-12-26 | Resolved: 2024-05-09

## 2024-05-09 PROBLEM — O24.410 DIET CONTROLLED GESTATIONAL DIABETES MELLITUS (GDM) IN SECOND TRIMESTER (HCC): Status: RESOLVED | Noted: 2024-01-06 | Resolved: 2024-05-09

## 2024-05-09 PROBLEM — Z34.82 PRENATAL CARE, SUBSEQUENT PREGNANCY, SECOND TRIMESTER (HCC): Status: RESOLVED | Noted: 2023-12-26 | Resolved: 2024-05-09

## 2024-05-09 NOTE — PROGRESS NOTES
Patient has no complaints  - HIV and T.pal ordered    NST reactive    CELESTINO by 7w3d US not consistent with LMP      -NIPS neg, GIRL   -carrier screen neg  -Flu done  - 12/26/2023   -Tdap 3/21/24     Morbid obesity (BMI 45)  -limit wt gain 11-20 lb discussed    -healthy eating and exercise discussed  - normal L2U, f/u 28 and 32 weeks normal, continue monthly per MFM      - NST 34 weeks  - CMP low potasium - corrected  -Delivery at 39-40 wks  -Daily ASA taking     Does not have Early onset GDM   -at 17 weeks A1c 5.7%, 1 hr   - DM education done, patient does not agree that she has DM since she passed 1GTT, not checking her blood sugars as it is an expense to get the machine, but she did adjust her diet  - 3/16/24 1 hr GTT -117     Anxiety   -meds: on & off since 1st baby born. Started in postpartum period. Took Xanax for panic attacks. Thinks citalopram, sertraline in the past. On & off. None for about 2 years  -therapist - used to see someone, but insurance changed.  referral placed at prior visit      Iron def anemia  Iron every other day with beverage/food w vitamin C- Is doing

## 2024-05-14 NOTE — PROGRESS NOTES
Patient having \"tightening\" in her belly mainly while going on walks - then goes away. Discussed leroy perez, discussed labor precautions  Baby active      CELESTINO by 7w3d US not consistent with LMP      -NIPS neg, GIRL   -carrier screen neg  -Flu done  - 12/26/2023   -Tdap 3/21/24  - HIV and T.pal ordered - drawn today, pending     Morbid obesity (BMI 45)  -limit wt gain 11-20 lb discussed  - doing well  -healthy eating and exercise discussed  -Daily ASA taking  - normal L2U, f/u 28 and 32 weeks normal, continue monthly per MFM   -4/23: EFW 49%ile, vertex, normal fluid   - NST 34 weeks  - CMP low potasium - corrected  -Delivery at 39-40 wks - would like to schedule IOL, will need to check cervix at next visits to make sure pitocin is appropriate       Prediabetes - normal GDM tests  -at 17 weeks A1c 5.7% (prediabetic), 1 hr   - DM education done per MM, patient does not agree that she has DM since she passed 1GTT, not checking her blood sugars as it is an expense to get the machine, but she did adjust her diet  - 3/16/24 1 hr GTT -117     Anxiety   -meds: on & off since 1st baby born. Started in postpartum period. Took Xanax for panic attacks. Thinks citalopram, sertraline in the past. On & off. None for about 2 years  -therapist - used to see someone, but insurance changed.  referral placed at prior visit      Iron def anemia  Iron every other day with beverage/food w vitamin C- Is doing     RTC for weekly NST

## 2024-05-14 NOTE — TELEPHONE ENCOUNTER
Breast Pump order was received from [facility].  Order form was placed in Dr. Elizabeth Abbott's bin for signature.

## 2024-05-22 PROCEDURE — 87150 DNA/RNA AMPLIFIED PROBE: CPT

## 2024-05-22 PROCEDURE — 87081 CULTURE SCREEN ONLY: CPT

## 2024-05-22 NOTE — PROGRESS NOTES
ERIN 36w2d    She is doing well, no complaints  -GBS done today  -SVE closed/30/-e cephalic     NST reactive, baseline 140's, moderate variability, accels to 160's      CELESTINO by 7w3d US not consistent with LMP      -NIPS neg, GIRL   -carrier screen neg  -Flu done  - 12/26/2023   -Tdap 3/21/24  - 3rd tri HIV and T.pallidium done      Morbid obesity (BMI 45)  -limit wt gain 11-20 lb discussed  - doing well  -healthy eating and exercise discussed  -Daily ASA taking  - normal L2U, f/u 28 and 32 weeks normal, continue monthly per MFM               -4/23: EFW 49%ile, vertex, normal fluid              -next appointment 5/29   - NST 34 weeks  - CMP low potasium - corrected  -Delivery at 39-40 wks - would like to schedule IOL, will need to check cervix at next visits to make sure pitocin is appropriate-scheduled for 6/17        Prediabetes - normal GDM tests  -at 17 weeks A1c 5.7% (prediabetic), 1 hr   - DM education done per MM, patient does not agree that she has DM since she passed 1GTT, not checking her blood sugars as it is an expense to get the machine, but she did adjust her diet  - 3/16/24 1 hr GTT -117     Anxiety   -meds: on & off since 1st baby born. Started in postpartum period. Took Xanax for panic attacks. Thinks citalopram, sertraline in the past. On & off. None for about 2 years  -therapist - used to see someone, but insurance changed.  referral placed at prior visit      Iron def anemia  Iron every other day with beverage/food w vitamin C- Is doing      RTC for weekly NST

## 2024-05-29 ENCOUNTER — ULTRASOUND ENCOUNTER (OUTPATIENT)
Dept: PERINATAL CARE | Facility: HOSPITAL | Age: 29
End: 2024-05-29
Attending: OBSTETRICS & GYNECOLOGY

## 2024-05-29 ENCOUNTER — ANESTHESIA (OUTPATIENT)
Dept: OBGYN UNIT | Facility: HOSPITAL | Age: 29
End: 2024-05-29
Payer: COMMERCIAL

## 2024-05-29 ENCOUNTER — HOSPITAL ENCOUNTER (INPATIENT)
Facility: HOSPITAL | Age: 29
LOS: 2 days | Discharge: HOME | End: 2024-05-31
Attending: OBSTETRICS & GYNECOLOGY | Admitting: OBSTETRICS & GYNECOLOGY
Payer: COMMERCIAL

## 2024-05-29 ENCOUNTER — ANESTHESIA EVENT (OUTPATIENT)
Dept: OBGYN UNIT | Facility: HOSPITAL | Age: 29
End: 2024-05-29
Payer: COMMERCIAL

## 2024-05-29 VITALS
WEIGHT: 265 LBS | HEIGHT: 65.5 IN | DIASTOLIC BLOOD PRESSURE: 72 MMHG | HEART RATE: 97 BPM | BODY MASS INDEX: 43.62 KG/M2 | SYSTOLIC BLOOD PRESSURE: 109 MMHG

## 2024-05-29 DIAGNOSIS — E66.01 MORBID OBESITY WITH BMI OF 45.0-49.9, ADULT (HCC): ICD-10-CM

## 2024-05-29 DIAGNOSIS — O99.213 MATERNAL MORBID OBESITY, ANTEPARTUM, THIRD TRIMESTER (HCC): Primary | ICD-10-CM

## 2024-05-29 DIAGNOSIS — E66.01 MATERNAL MORBID OBESITY, ANTEPARTUM, THIRD TRIMESTER (HCC): Primary | ICD-10-CM

## 2024-05-29 DIAGNOSIS — O41.03X0: ICD-10-CM

## 2024-05-29 PROBLEM — Z34.90 PREGNANCY (HCC): Status: ACTIVE | Noted: 2024-05-29

## 2024-05-29 LAB
ANTIBODY SCREEN: NEGATIVE
BASOPHILS # BLD AUTO: 0.05 X10(3) UL (ref 0–0.2)
BASOPHILS NFR BLD AUTO: 0.4 %
EOSINOPHIL # BLD AUTO: 0.39 X10(3) UL (ref 0–0.7)
EOSINOPHIL NFR BLD AUTO: 3.4 %
ERYTHROCYTE [DISTWIDTH] IN BLOOD BY AUTOMATED COUNT: 14.5 %
HCT VFR BLD AUTO: 34.7 %
HGB BLD-MCNC: 11.5 G/DL
IMM GRANULOCYTES # BLD AUTO: 0.1 X10(3) UL (ref 0–1)
IMM GRANULOCYTES NFR BLD: 0.9 %
LYMPHOCYTES # BLD AUTO: 1.33 X10(3) UL (ref 1–4)
LYMPHOCYTES NFR BLD AUTO: 11.7 %
MCH RBC QN AUTO: 29.1 PG (ref 26–34)
MCHC RBC AUTO-ENTMCNC: 33.1 G/DL (ref 31–37)
MCV RBC AUTO: 87.8 FL
MONOCYTES # BLD AUTO: 0.96 X10(3) UL (ref 0.1–1)
MONOCYTES NFR BLD AUTO: 8.5 %
NEUTROPHILS # BLD AUTO: 8.51 X10 (3) UL (ref 1.5–7.7)
NEUTROPHILS # BLD AUTO: 8.51 X10(3) UL (ref 1.5–7.7)
NEUTROPHILS NFR BLD AUTO: 75.1 %
PLATELET # BLD AUTO: 267 10(3)UL (ref 150–450)
RBC # BLD AUTO: 3.95 X10(6)UL
RH BLOOD TYPE: POSITIVE
T PALLIDUM AB SER QL IA: NONREACTIVE
WBC # BLD AUTO: 11.3 X10(3) UL (ref 4–11)

## 2024-05-29 PROCEDURE — 3E033VJ INTRODUCTION OF OTHER HORMONE INTO PERIPHERAL VEIN, PERCUTANEOUS APPROACH: ICD-10-PCS | Performed by: OBSTETRICS & GYNECOLOGY

## 2024-05-29 PROCEDURE — 76819 FETAL BIOPHYS PROFIL W/O NST: CPT

## 2024-05-29 PROCEDURE — 76816 OB US FOLLOW-UP PER FETUS: CPT | Performed by: OBSTETRICS & GYNECOLOGY

## 2024-05-29 PROCEDURE — 10907ZC DRAINAGE OF AMNIOTIC FLUID, THERAPEUTIC FROM PRODUCTS OF CONCEPTION, VIA NATURAL OR ARTIFICIAL OPENING: ICD-10-PCS | Performed by: OBSTETRICS & GYNECOLOGY

## 2024-05-29 PROCEDURE — 59410 OBSTETRICAL CARE: CPT | Performed by: OBSTETRICS & GYNECOLOGY

## 2024-05-29 RX ORDER — ACETAMINOPHEN 500 MG
1000 TABLET ORAL EVERY 6 HOURS PRN
Status: DISCONTINUED | OUTPATIENT
Start: 2024-05-29 | End: 2024-05-29 | Stop reason: HOSPADM

## 2024-05-29 RX ORDER — TERBUTALINE SULFATE 1 MG/ML
0.25 INJECTION, SOLUTION SUBCUTANEOUS AS NEEDED
Status: DISCONTINUED | OUTPATIENT
Start: 2024-05-29 | End: 2024-05-29 | Stop reason: HOSPADM

## 2024-05-29 RX ORDER — ACETAMINOPHEN 500 MG
1000 TABLET ORAL EVERY 6 HOURS PRN
Status: DISCONTINUED | OUTPATIENT
Start: 2024-05-29 | End: 2024-05-31

## 2024-05-29 RX ORDER — LIDOCAINE HYDROCHLORIDE AND EPINEPHRINE 15; 5 MG/ML; UG/ML
INJECTION, SOLUTION EPIDURAL AS NEEDED
Status: DISCONTINUED | OUTPATIENT
Start: 2024-05-29 | End: 2024-05-29 | Stop reason: SURG

## 2024-05-29 RX ORDER — CITRIC ACID/SODIUM CITRATE 334-500MG
30 SOLUTION, ORAL ORAL AS NEEDED
Status: DISCONTINUED | OUTPATIENT
Start: 2024-05-29 | End: 2024-05-29 | Stop reason: HOSPADM

## 2024-05-29 RX ORDER — BISACODYL 10 MG
10 SUPPOSITORY, RECTAL RECTAL ONCE AS NEEDED
Status: DISCONTINUED | OUTPATIENT
Start: 2024-05-29 | End: 2024-05-31

## 2024-05-29 RX ORDER — IBUPROFEN 400 MG/1
400 TABLET ORAL EVERY 6 HOURS PRN
Status: DISCONTINUED | OUTPATIENT
Start: 2024-05-29 | End: 2024-05-31

## 2024-05-29 RX ORDER — SODIUM CHLORIDE, SODIUM LACTATE, POTASSIUM CHLORIDE, CALCIUM CHLORIDE 600; 310; 30; 20 MG/100ML; MG/100ML; MG/100ML; MG/100ML
INJECTION, SOLUTION INTRAVENOUS CONTINUOUS
Status: DISCONTINUED | OUTPATIENT
Start: 2024-05-29 | End: 2024-05-29 | Stop reason: HOSPADM

## 2024-05-29 RX ORDER — ACETAMINOPHEN 500 MG
500 TABLET ORAL EVERY 6 HOURS PRN
Status: DISCONTINUED | OUTPATIENT
Start: 2024-05-29 | End: 2024-05-29 | Stop reason: HOSPADM

## 2024-05-29 RX ORDER — IBUPROFEN 600 MG/1
600 TABLET ORAL EVERY 6 HOURS PRN
Status: DISCONTINUED | OUTPATIENT
Start: 2024-05-29 | End: 2024-05-31

## 2024-05-29 RX ORDER — NALBUPHINE HYDROCHLORIDE 10 MG/ML
2.5 INJECTION, SOLUTION INTRAMUSCULAR; INTRAVENOUS; SUBCUTANEOUS
Status: DISCONTINUED | OUTPATIENT
Start: 2024-05-29 | End: 2024-05-31

## 2024-05-29 RX ORDER — SIMETHICONE 80 MG
80 TABLET,CHEWABLE ORAL 3 TIMES DAILY PRN
Status: DISCONTINUED | OUTPATIENT
Start: 2024-05-29 | End: 2024-05-31

## 2024-05-29 RX ORDER — BUPIVACAINE HYDROCHLORIDE 2.5 MG/ML
INJECTION, SOLUTION EPIDURAL; INFILTRATION; INTRACAUDAL AS NEEDED
Status: DISCONTINUED | OUTPATIENT
Start: 2024-05-29 | End: 2024-05-29 | Stop reason: SURG

## 2024-05-29 RX ORDER — DOCUSATE SODIUM 100 MG/1
100 CAPSULE, LIQUID FILLED ORAL 2 TIMES DAILY
Status: DISCONTINUED | OUTPATIENT
Start: 2024-05-29 | End: 2024-05-31

## 2024-05-29 RX ORDER — ONDANSETRON 2 MG/ML
4 INJECTION INTRAMUSCULAR; INTRAVENOUS EVERY 6 HOURS PRN
Status: DISCONTINUED | OUTPATIENT
Start: 2024-05-29 | End: 2024-05-29 | Stop reason: HOSPADM

## 2024-05-29 RX ORDER — IBUPROFEN 600 MG/1
600 TABLET ORAL ONCE AS NEEDED
Status: COMPLETED | OUTPATIENT
Start: 2024-05-29 | End: 2024-05-29

## 2024-05-29 RX ORDER — BUPIVACAINE HCL/0.9 % NACL/PF 0.25 %
5 PLASTIC BAG, INJECTION (ML) EPIDURAL AS NEEDED
Status: DISCONTINUED | OUTPATIENT
Start: 2024-05-29 | End: 2024-05-31

## 2024-05-29 RX ORDER — ACETAMINOPHEN 500 MG
500 TABLET ORAL EVERY 6 HOURS PRN
Status: DISCONTINUED | OUTPATIENT
Start: 2024-05-29 | End: 2024-05-31

## 2024-05-29 RX ORDER — DEXTROSE, SODIUM CHLORIDE, SODIUM LACTATE, POTASSIUM CHLORIDE, AND CALCIUM CHLORIDE 5; .6; .31; .03; .02 G/100ML; G/100ML; G/100ML; G/100ML; G/100ML
INJECTION, SOLUTION INTRAVENOUS AS NEEDED
Status: DISCONTINUED | OUTPATIENT
Start: 2024-05-29 | End: 2024-05-29 | Stop reason: HOSPADM

## 2024-05-29 RX ADMIN — BUPIVACAINE HYDROCHLORIDE 10 ML: 2.5 INJECTION, SOLUTION EPIDURAL; INFILTRATION; INTRACAUDAL at 14:41:00

## 2024-05-29 RX ADMIN — LIDOCAINE HYDROCHLORIDE AND EPINEPHRINE 3 ML: 15; 5 INJECTION, SOLUTION EPIDURAL at 14:39:00

## 2024-05-29 NOTE — H&P
[facility]  Obstetrics and Gynecology  Induction History & Physical    Tatiana Vargas Patient Status:  Inpatient    1995 MRN [id number]   Location [facility] LABOR & DELIVERY Attending Elizabeth Abbott DO   Hospital Day 0 PCP NOHEMI SANCHES     Subjective:   29 year old year old female  at 37w2d Estimated Date of Delivery: 24 presents for induction of labor due to oligohydramnios at term.  Noted to have low fluid on US at MFM.  Hx of morbid obesity.  No leaking vaginally but hx of slow leaks in previous pregnancies.  Comfortable with epidural.     Complications: obesity, prediabetes, anxiety    OB Ultrasounds:   today, EFW 39%, DIONY 6.5, no 2x2 pockets    OB History    Para Term  AB Living   4 2 2     2   SAB IAB Ectopic Multiple Live Births           2      # Outcome Date GA Lbr Odell/2nd Weight Sex Type Anes PTL Lv   4 Current            3 Term 20 39w1d / 00:09 7 lb 3 oz (3.26 kg) M NORMAL SPONT EPI N RICH   2 Term 03/13/15   8 lb (3.629 kg) M Vag-Spont   RICH   1                Obstetric Comments   Current - morbid obesity, early onset GDM dx 16 wk, h/o elevated LFTs (baseline LFTs normal at 16 wk), anxiety, depression, panic attacks        Past Medical History:    Abnormal LFTs    LFTs normal 24 at 16 wk gestation    Anemia    Anxiety    Anxiety with depression    Contact dermatitis, unspecified contact dermatitis type, unspecified trigger    Depression    Gestational diabetes (HCC)    early onset - dx 16 wk with elevated A1c    LGSIL on Pap smear of cervix    Morbid obesity with BMI of 40.0-44.9, adult (HCC)    Morbid obesity with BMI of 45.0-49.9, adult (HCC)    Panic attack    Screening for genetic disease carrier status    [employer] Carrier Screen = Negative    Smoker    Vitamin D deficiency     Past Surgical History:   Procedure Laterality Date    Cholecystectomy  2012    Removal gallbladder  2012        Medications:  Medications Prior to Admission   Medication Sig Dispense Refill Last Dose    Cholecalciferol (D3 VITAMIN OR)    2024    Prenatal Multivit-Min-Fe-FA (PRENATAL VITAMINS OR) Take by mouth.   2024    aspirin 81 MG Oral Tab EC Take 1 tablet (81 mg total) by mouth daily. (Patient not taking: Reported on 2024)       Ferrous Gluconate (IRON) 240 (27 Fe) MG Oral Tab 4 (four) times a week.       Magnesium 500 MG Oral Tab Take by mouth daily. (Patient not taking: Reported on 2024)          Allergies:  Allergies   Allergen Reactions    Cat Hair Extract HIVES, Runny nose and UNKNOWN    Dander HIVES     Cat and Dog Dander    Shellfish Allergy ANXIETY, Coughing, OTHER (SEE COMMENTS), HIVES, RASH and SHORTNESS OF BREATH    Shrimp HIVES and WHEEZING    Shrimp Extract Allergy Skin Test ANAPHYLAXIS, HIVES and SHORTNESS OF BREATH        Social History:  Social History     Tobacco Use    Smoking status: Never    Smokeless tobacco: Never   Substance Use Topics    Alcohol use: Not Currently     Comment: Not while pregnant         Objective:     Vitals:    24 1200   BP: 127/79   Temp: 97.8 °F (36.6 °C)     General: Alert and oriented, no apparent distress  Abdomen: Gravid, soft, non-tender  Extremities: Non-tender, negative Pawel's sign  SVE: Vertex, 4/80/-2 AROM clear    GBS negative    Assessment/Plan:   29 year old year old female  at 37w2d.    Induction of labor due to oligohydramnios at term per recommendation MFM.  Continue Pitocin per protocol.  Fetal well being reassuring.  Dilaudid/Epidural per patient request.    Patient Active Problem List   Diagnosis    Anxiety with depression    Panic attack    Contact dermatitis, unspecified contact dermatitis type, unspecified trigger    Non morbid obesity due to excess calories    Vitamin D deficiency    Abnormal LFTs    Papanicolaou smear of cervix with low grade squamous intraepithelial lesion (LGSIL)    Prenatal care, subsequent  pregnancy in third trimester (HCC)    Stress    Anxiety during pregnancy in second trimester, antepartum (HCC)    Maternal morbid obesity in second trimester, antepartum (HCC)    Morbid obesity with BMI of 45.0-49.9, adult (HCC)    Hypokalemia    Iron deficiency anemia during pregnancy (HCC)    Pregnancy (HCC)       Risks, benefits, alternatives and possible complications have been discussed in detail with the patient.  Pre-admission, admission, and post admission procedures and expectations were discussed in detail.  All questions answered, all appropriate consents to be signed at the hospital.    Valerio Salinas MD  [facility]yn

## 2024-05-29 NOTE — ANESTHESIA PROCEDURE NOTES
Labor Analgesia    Date/Time: 5/29/2024 2:20 PM    Performed by: Jamaal Ulloa MD  Authorized by: Jamaal Ulloa MD      General Information and Staff    Start Time:  5/29/2024 2:20 PM  End Time:  5/29/2024 2:44 PM  Anesthesiologist:  Jamaal Ulloa MD  Performed by:  Anesthesiologist  Patient Location:  OB  Site Identification: surface landmarks    Reason for Block: labor epidural    Preanesthetic Checklist: patient identified, IV checked, risks and benefits discussed, monitors and equipment checked, pre-op evaluation, timeout performed, IV bolus, anesthesia consent and sterile technique used      Procedure Details    Patient Position:  Sitting  Prep: ChloraPrep    Monitoring:  Heart rate and continuous pulse ox  Approach:  Midline    Epidural Needle    Injection Technique:   Needle Type:  Tuohy  Needle Gauge:  17 G  Needle Length:  3.375 in  Location:  L3-4    Spinal Needle      Catheter    Catheter Type:  End hole  Catheter Size:  19 G  Test Dose:  Negative    Assessment      Additional Comments     Marcaine 0.25% 10cc given  100 mcg fentanyl given  Infusion started at 12cc/hr

## 2024-05-29 NOTE — PROGRESS NOTES
Pt is a 29 year old female admitted to 120/120-A.   Sent over from [facility] for oligo   Pt is  37w2d intra-uterine pregnancy.  History obtained, consents signed. Oriented to room, staff, and plan of care.

## 2024-05-29 NOTE — PROGRESS NOTES
Outpatient Maternal-Fetal Medicine Follow-Up     Dear Dr. Kelley,     Thank you for requesting ultrasound evaluation and maternal fetal medicine consultation on your patient Tatiana Voss.  As you are aware she is a 26/27 year old female  with a Fitch pregnancy and an Estimated Date of Delivery: 24.  She returned to maternal-fetal medicine today for a follow-up visit.  Her history was reviewed from her prior visit and there were no interval changes.         Antepartum Risk Factors  Class III obesity  History of GDM  Obstructive sleep apnea    S: positive fetal movement.  Has history of slow leak of amniotic fluid in prior pregnancies.  PHYSICAL EXAMINATION:  /72 (BP Location: Right arm, Patient Position: Sitting, Cuff Size: large)   Pulse 97   Ht 5' 5.5\" (1.664 m)   Wt 265 lb (120.2 kg)   LMP 2023   BMI 43.43 kg/m²   General: alert and oriented, no acute distress  Abdomen: gravid, soft, non-tender       OBSTETRIC ULTRASOUND  The patient had a fetal growth & BPP ultrasound today which I interpreted the results and reviewed them with the patient.  Ultrasound Findings:  Single IUP in cephalic presentation.    Placenta is anterior.   A 3 vessel cord is noted.  Cardiac activity is present at 134 bpm  EFW 2977 g ( 6 lb 9 oz); 39%.    DIONY is  6.5 cm.  No 2 x 2 cm pocket is identified. oligohydramnios  BPP is 6/8.     The fetal measurements are consistent with established EDC. No gross ultrasound evidence of structural abnormalities are seen today. The patient understands that ultrasound cannot rule out all structural and chromosomal abnormalities.     See imaging tab for the complete US report.     DISCUSSION  During her visit we discussed and reviewed the following issues:  OBESITY:  Her BMI prior to pregnancy was 45  Obesity during pregnancy is associated with numerous maternal and  risks which was discussed previously and reviewed.  See [facility] note from 2024 for detailed  review.     Obstructive sleep apnea (KENNY) and potential pregnancy complications were discussed previously and reviewed.  See [facility] note from 2024 for detailed review.      Due to oligohdyramnios, I recommended delivery.  She agrees with this plan.  We discussed the recommended plan of care based on her  risk factors.  Tatiana and her questions thiagoe answered to her satisfaction     IMPRESSION:  IUP at 37w2d   Normal fetal growth and  testing  Maternal morbid obesity complicating pregnancy  Obstructive sleep apnea  oligohydramnios     RECOMMENDATIONS:  Continue care with Dr. Kelley  Recommend rule out for ruptured membranes.  Recommend delivery     Total time spent 30 minutes this calendar day which includes preparing to see the patient including chart review, obtaining and/or reviewing additional medical history, performing a physical exam and evaluation, documenting clinical information in the electronic medical record, independently interpreting results, counseling the patient, communicating results to the patient/family/caregiver and coordinating care.     Case discussed with patient who demonstrated understanding and agreement with plan.     Thank you for allowing me to participate in the care of this patient.  Please feel free to contact me with any questions.    Naomi Franz MD   Maternal-Fetal Medicine       Note to patient and family:  The 21st Century Cures Act makes medical notes available to patients in the interest of transparency.  However, please be advised that this is a medical document.  It is intended as a peer to peer communication.  It is written in medical language and may contain abbreviations or verbiage that are technical and unfamiliar.  It may appear blunt or direct.  Medical documents are intended to carry relevant information, facts as evident, and the clinical opinion of the practitioner.

## 2024-05-29 NOTE — ANESTHESIA PREPROCEDURE EVALUATION
PRE-OP EVALUATION    Patient Name: Tatiana Vargas    Admit Diagnosis: pregnancy  Pregnancy (HCC)    Pre-op Diagnosis: * No surgery found *        Anesthesia Procedure: LABOR ANALGESIA    * Surgery not found *    Pre-op vitals reviewed.  Temp: 97.8 °F (36.6 °C)  Pulse: 88  BP: 120/67     Body mass index is 43.43 kg/m².    Current medications reviewed.  Hospital Medications:   lactated ringers infusion   Intravenous Continuous    dextrose in lactated ringers 5% infusion   Intravenous PRN    lactated ringers IV bolus 500 mL  500 mL Intravenous PRN    acetaminophen (Tylenol Extra Strength) tab 500 mg  500 mg Oral Q6H PRN    acetaminophen (Tylenol Extra Strength) tab 1,000 mg  1,000 mg Oral Q6H PRN    ibuprofen (Motrin) tab 600 mg  600 mg Oral Once PRN    ondansetron (Zofran) 4 MG/2ML injection 4 mg  4 mg Intravenous Q6H PRN    oxyTOCIN in sodium chloride 0.9% (Pitocin) 30 Units/500mL infusion premix  62.5-900 eze-units/min Intravenous Continuous    terbutaline (Brethine) 1 MG/ML injection 0.25 mg  0.25 mg Subcutaneous PRN    sodium citrate-citric acid (Bicitra) 500-334 MG/5ML oral solution 30 mL  30 mL Oral PRN    misoprostol (CYTOTEC) partial tablets 25 mcg  25 mcg Vaginal 6 times per day    oxyTOCIN in sodium chloride 0.9% (Pitocin) 30 Units/500mL infusion premix  0.5-20 eze-units/min Intravenous Continuous    [COMPLETED] lactated ringers IV bolus 1,000 mL  1,000 mL Intravenous Once    fentaNYL-bupivacaine 2 mcg/mL-0.125% in sodium chloride 0.9% 100 mL EPIDURAL infusion premix  12 mL/hr Epidural Continuous    fentaNYL (Sublimaze) 50 mcg/mL injection 100 mcg  100 mcg Epidural Once    EPHEDrine (PF) 25 MG/5 ML injection 5 mg  5 mg Intravenous PRN    nalbuphine (Nubain) 10 mg/mL injection 2.5 mg  2.5 mg Intravenous Q15 Min PRN       Outpatient Medications:     Medications Prior to Admission   Medication Sig Dispense Refill Last Dose    Cholecalciferol (D3 VITAMIN OR)    5/28/2024    Prenatal Multivit-Min-Fe-FA  (PRENATAL VITAMINS OR) Take by mouth.   5/28/2024    aspirin 81 MG Oral Tab EC Take 1 tablet (81 mg total) by mouth daily. (Patient not taking: Reported on 5/29/2024)       Ferrous Gluconate (IRON) 240 (27 Fe) MG Oral Tab 4 (four) times a week.       Magnesium 500 MG Oral Tab Take by mouth daily. (Patient not taking: Reported on 5/22/2024)          Allergies: Cat hair extract, Dander, Shellfish allergy, Shrimp, and Shrimp extract allergy skin test      Anesthesia Evaluation    Patient summary reviewed.    Anesthetic Complications  (-) history of anesthetic complications         GI/Hepatic/Renal                                 Cardiovascular                (+) obesity                                       Endo/Other                                  Pulmonary                           Neuro/Psych      (+) depression  (+) anxiety                      Abnormal LFTs Anxiety during pregnancy in second trimester, antepartum ([facility])  Anxiety with depression Contact dermatitis, unspecified contact dermatitis type, unspecified trigger  Hypokalemia Iron deficiency anemia during pregnancy ([facility])  Maternal morbid obesity in second trimester, antepartum ([facility]) Morbid obesity with BMI of 45.0-49.9, adult ([facility])  Non morbid obesity due to excess calories Panic attack  Papanicolaou smear of cervix with low grade squamous intraepithelial lesion (LGSIL) Pregnancy ([facility])  Prenatal care, subsequent pregnancy in third trimester ([facility]) Stress  Vitamin D deficiency             Past Surgical History:   Procedure Laterality Date    Cholecystectomy  2012    Removal gallbladder  01/01/2012     Social History     Socioeconomic History    Marital status:    Tobacco Use    Smoking status: Never    Smokeless tobacco: Never   Vaping Use    Vaping status: Never Used   Substance and Sexual Activity    Alcohol use: Not Currently     Comment: Not while pregnant     Drug use: Not Currently     Types: Cannabis     Comment: Former- 04/2019     Sexual  activity: Yes     Partners: Male   Other Topics Concern    Caffeine Concern Yes    Exercise Yes    Seat Belt No    Special Diet Yes    Stress Concern No    Weight Concern Yes     History   Drug Use Unknown     Comment: Former- 04/2019      Available pre-op labs reviewed.  Lab Results   Component Value Date    WBC 11.3 (H) 05/29/2024    RBC 3.95 05/29/2024    HGB 11.5 (L) 05/29/2024    HCT 34.7 (L) 05/29/2024    MCV 87.8 05/29/2024    MCH 29.1 05/29/2024    MCHC 33.1 05/29/2024    RDW 14.5 05/29/2024    .0 05/29/2024               Airway      Mallampati: I  Mouth opening: >3 FB  TM distance: > 6 cm  Neck ROM: full Cardiovascular    Cardiovascular exam normal.         Dental    Dentition appears grossly intact         Pulmonary    Pulmonary exam normal.                 Other findings              ASA: 2 and emergent  Plan: epidural  NPO status verified and           Plan/risks discussed with: patient                Present on Admission:  **None**

## 2024-05-30 LAB
BASOPHILS # BLD AUTO: 0.07 X10(3) UL (ref 0–0.2)
BASOPHILS NFR BLD AUTO: 0.7 %
EOSINOPHIL # BLD AUTO: 0.46 X10(3) UL (ref 0–0.7)
EOSINOPHIL NFR BLD AUTO: 4.4 %
ERYTHROCYTE [DISTWIDTH] IN BLOOD BY AUTOMATED COUNT: 14.8 %
HCT VFR BLD AUTO: 29.5 %
HGB BLD-MCNC: 10.2 G/DL
IMM GRANULOCYTES # BLD AUTO: 0.1 X10(3) UL (ref 0–1)
IMM GRANULOCYTES NFR BLD: 1 %
LYMPHOCYTES # BLD AUTO: 2.15 X10(3) UL (ref 1–4)
LYMPHOCYTES NFR BLD AUTO: 20.8 %
MCH RBC QN AUTO: 30 PG (ref 26–34)
MCHC RBC AUTO-ENTMCNC: 34.6 G/DL (ref 31–37)
MCV RBC AUTO: 86.8 FL
MONOCYTES # BLD AUTO: 1.24 X10(3) UL (ref 0.1–1)
MONOCYTES NFR BLD AUTO: 12 %
NEUTROPHILS # BLD AUTO: 6.33 X10 (3) UL (ref 1.5–7.7)
NEUTROPHILS # BLD AUTO: 6.33 X10(3) UL (ref 1.5–7.7)
NEUTROPHILS NFR BLD AUTO: 61.1 %
PLATELET # BLD AUTO: 222 10(3)UL (ref 150–450)
RBC # BLD AUTO: 3.4 X10(6)UL
WBC # BLD AUTO: 10.4 X10(3) UL (ref 4–11)

## 2024-05-30 RX ORDER — IBUPROFEN 600 MG/1
600 TABLET ORAL EVERY 6 HOURS PRN
Qty: 30 TABLET | Refills: 0 | Status: SHIPPED | OUTPATIENT
Start: 2024-05-30

## 2024-05-30 RX ORDER — PSEUDOEPHEDRINE HCL 30 MG
100 TABLET ORAL 2 TIMES DAILY PRN
Qty: 30 CAPSULE | Refills: 0 | Status: SHIPPED | OUTPATIENT
Start: 2024-05-30

## 2024-05-30 NOTE — L&D DELIVERY NOTE
Sam, Girl [[id number]]      Labor Events     labor?: No   steroids?: None  Antibiotics received during labor?: No  Rupture date/time: 2024 152     Rupture type: AROM  Fluid color: Clear  Labor type: Induced Onset of Labor  Induction: Oxytocin, AROM  Indications for induction: Oligohydramnios  Intrapartum & labor complications: Oligohydramnios       Labor Length    1st stage: 1h 36m  2nd stage: 0h 02m  3rd stage: 0h 03m       Labor Event Times    Labor onset date/time: 2024 1522  Dilation complete date/time: 2024  Start pushing date/time: 2024        Presentation    Presentation: Vertex  Position: Left Occiput Anterior       Operative Delivery    Operative Vaginal Delivery: No                Shoulder Dystocia    Shoulder Dystocia: No       Anesthesia    Method: Epidural              [place] Delivery      Delivery date/time:  24 17:00:58   Delivery type: Normal spontaneous vaginal delivery    Details:     Delivery location: delivery room       Delivery Providers    Delivering Clinician: Marielena Qureshi MD   Delivery personnel:  Provider Role   Adilene Cisneros RN Baby Nurse   Leydi Villanueva RN Delivery Nurse             Cord    Vessels: 3 Vessels  Complications: None  Timed cord clamping: Yes  Time in sec: 60  Cord blood disposition: to lab  Gases sent?: No       Resuscitation    Method: Suctioning        Measurements      Weight: 3000 g 6 lb 9.8 oz Length: 47 cm     Head circum.: 35 cm Chest circum.: 34 cm      Abdominal circum.: 29 cm           Placenta    Date/time: 2024 1704  Removal: Spontaneous  Appearance: Intact  Disposition: Pathology       Apgars    Living status: Living   Apgar Scoring Key:    0 1 2    Skin color Blue or pale Acrocyanotic Completely pink    Heart rate Absent <100 bpm >100 bpm    Reflex irritability No response Grimace Cry or active withdrawal    Muscle tone Limp Some flexion Active motion    Respiratory  effort Absent Weak cry; hypoventilation Good, crying              1 Minute:  5 Minute:  10 Minute:  15 Minute:  20 Minute:      Skin color: 0  1       Heart rate: 2  2       Reflex irritablity: 2  2       Muscle tone: 2  2       Respiratory effort: 2  2       Total: 8  9          Apgars assigned by: NAM ALATORRE RN   disposition: with mother       Skin to Skin    Skin to skin initiated date/time: 2024 1715  Skin to skin with: Mother       Vaginal Count    Initial count RN: Ledyi Villanueva RN  Initial count Tech: Mytych, Dena R   Sponges   Sharps    Initial counts 11   0    Final counts 11   0    Final count RN: Leydi Villanueva RN  Final count MD: Marielena Qureshi MD       Lacerations    Episiotomy: None  Perineal lacerations: None      Vaginal laceration?: No      Cervical laceration?: No    Clitoral laceration?: No                    Vaginal Delivery Note          Tatiana Vargas Patient Status:  Inpatient    1995 MRN [id number]   [facility] 1SW-J Attending Elizabeth Abbott,    Hosp Day # 0 PCP NOHEMI SANCHES     Date of Delivery: 24    Pre Op Dx:  IUP at Term    Post Op Dx: Same - delivered    Op: Normal Spontaneous Vaginal Delivery    Surgeon: Marielena Qureshi MD        Anesthesia: Epidural    Indications:  Patient is a 29 year old  at 37w2d who for IOL 2/2 oligohydramnios. Pitocin initiated. AROM performed. She progressed to complete cervical dilation.       Findings:    Sex: female     Weight:3000g      Apgars: 8/9      Lacerations: no perineal laceration, no periurethral, no cervical       Procedure:  The patients was placed in the dorsolithotomy position and prepped.  She was encouraged to push.  As the head was delivered in KAL position, the legs were lowered and the perineum was supported to decrease the risk of tearing. The shoulders rotated easily and delivery was completed without complication.  Bulb suction was performed.  The cord was doubly clamped  then cut after 30 seconds of cord pulsation noted.  The baby was placed on the mother's abdomen at her request.  The cord blood was sampled. Placenta delivered spontaneosly by uterine massage. IV Pitocin was then initiated. The uterus was explored and evacuation of blood clots noted. Uterus noted to be firm. Good hemostasis noted. The perineum, vaginal mucosa and cervix was then examined.     Bleeding was minimal.  The patient was then moved to the supine position in stable condition.  Sponge and instrument counts were correct.    Complications:  None     Mother and infant in good condition.    Marielena Qureshi MD   [facility] - OBGYN            Note to patient and family   The 21st Century Cures Act makes medical notes available to patients in the interest of transparency.  However, please be advised that this is a medical document.  It is intended as lkty-ij-pzie communication.  It is written and medical language may contain abbreviations or verbiage that are technical and unfamiliar.  It may appear blunt or direct.  Medical documents are intended to carry relevant information, facts as evident, and the clinical opinion of the practitioner.

## 2024-05-30 NOTE — PAYOR COMM NOTE
--------------  ADMISSION REVIEW     Payor: [employer]/EPO  Subscriber #:  [id number]  Authorization Number: [id number]    Admit date: 24  Admit time: 10:55 AM       REVIEW DOCUMENTATION:    29 year old year old female  at 37w2d Estimated Date of Delivery: 24 presents for induction of labor due to oligohydramnios at term.  Noted to have low fluid on US at M.  Hx of morbid obesity.  No leaking vaginally but hx of slow leaks in previous pregnancies.  Comfortable with epidural.      Complications: obesity, prediabetes, anxiety     OB Ultrasounds:   today, EFW 39%, DIONY 6.5, no 2x2 pockets                      OB History    Para Term  AB Living   4 2 2     2   SAB IAB Ectopic Multiple Live Births              2        General: Alert and oriented, no apparent distress  Abdomen: Gravid, soft, non-tender  Extremities: Non-tender, negative Pawel's sign  SVE: Vertex, 4/80/-2 AROM clear     GBS negative     Assessment/Plan:   29 year old year old female  at 37w2d.    Induction of labor due to oligohydramnios at term per recommendation MF.  Continue Pitocin per protocol.  Fetal well being reassuring.  Dilaudid/Epidural per patient request.      [place] Delivery       Delivery date/time:  24 17:00:58     FEMALE       Measurements       Weight: 3000 g 6 lb 9.8 oz     APGAR  Total: 8  9                       MEDICATIONS ADMINISTERED IN LAST 1 DAY:  benzocaine-menthol (Dermoplast) 20-0.5 % topical spray 1 spray       Date Action Dose Route User    2024 1808 Given 1 spray Topical Leydi Villanueva RN          bupivacaine PF (Marcaine) 0.25% injection       Date Action Dose Route User    2024 1441 Given 10 mL Epidural Jamaal Ulloa MD          docusate sodium (Colace) cap 100 mg       Date Action Dose Route User    2024 0541 Given 100 mg Oral Martha Che RN          EPHEDrine (PF) 25 MG/5 ML injection 5 mg       Date Action  Dose Route User    5/29/2024 1454 Given 10 mg Intravenous Leydi Villanueva RN          fentaNYL-bupivacaine 2 mcg/mL-0.125% in sodium chloride 0.9% 100 mL EPIDURAL infusion premix       Date Action Dose Route User    5/29/2024 1443 New Bag 12 mL/hr Epidural Jamaal Ulloa MD          fentaNYL (Sublimaze) 50 mcg/mL injection 100 mcg       Date Action Dose Route User    5/29/2024 1441 Given 100 mcg Epidural Jamaal Ulloa MD          ibuprofen (Motrin) tab 600 mg       Date Action Dose Route User    5/29/2024 1746 Given 600 mg Oral Leydi Villanueva RN          ibuprofen (Motrin) tab 600 mg       Date Action Dose Route User    5/30/2024 0541 Given 600 mg Oral Martha Che RN    5/29/2024 2312 Given 600 mg Oral Martha Che RN          lactated ringers IV bolus 1,000 mL       Date Action Dose Route User    5/29/2024 1344 New Bag 1,000 mL Intravenous Leydi Villanueva RN          lactated ringers infusion       Date Action Dose Route User    5/29/2024 1148 New Bag (none) Intravenous Leydi Villanueva RN          lidocaine 1.5%-EPINEPHrine 1:200,000 (Xylocaine-Epinephrine) injection       Date Action Dose Route User    5/29/2024 1439 Given 3 mL Epidural Jamaal Ulloa MD          oxyTOCIN in sodium chloride 0.9% (Pitocin) 30 Units/500mL infusion premix       Date Action Dose Route User    5/29/2024 1704 Bolus from Bag 900 eze-units/min Intravenous Leydi Villanueva RN          oxyTOCIN in sodium chloride 0.9% (Pitocin) 30 Units/500mL infusion premix       Date Action Dose Route User    5/29/2024 1524 Rate/Dose Change 20 eze-units/min Intravenous Leydi Villanueva RN    5/29/2024 1400 Rate/Dose Change 18 eze-units/min Intravenous Leydi Villanueva RN    5/29/2024 1345 Rate/Dose Change 16 eze-units/min Intravenous Leydi Villanueva RN    5/29/2024 1330 Rate/Dose Change 14 zee-units/min Intravenous Leydi Villanueva RN    5/29/2024 1315 Rate/Dose Change 12 eze-units/min Intravenous Leydi Villanueva,  RN    5/29/2024 1300 Rate/Dose Change 10 eze-units/min Intravenous Leydi Villanueva RN    5/29/2024 1245 Rate/Dose Change 8 eze-units/min Intravenous Leydi Villanueva RN    5/29/2024 1230 Rate/Dose Change 6 eze-units/min Intravenous Leydi Villanueva RN    5/29/2024 1215 Rate/Dose Change 4 eze-units/min Intravenous Leydi Villanueva RN    5/29/2024 1157 New Bag 2 eze-units/min Intravenous Leydi Villanueva RN          witch hazel-glycerin ( WITCH HAZEL) external pad       Date Action Dose Route User    5/29/2024 1808 Given 40 each Topical Leydi Villanueva RN            Vitals (last day)       Date/Time Temp Pulse Resp BP SpO2 Weight O2 Device O2 Flow Rate (L/min) Who    05/30/24 0750 98 °F (36.7 °C) 85 16 114/69 -- -- None (Room air) -- AW    05/29/24 1930 98.5 °F (36.9 °C) 87 18 130/61 -- -- -- -- AL    05/29/24 1449 -- -- -- 121/72 -- -- -- -- TG    05/29/24 1446 -- -- -- 132/63 -- -- -- -- TG    05/29/24 1443 -- 88 -- 151/58 -- -- -- -- TG    05/29/24 1430 -- -- -- 130/76 -- -- -- -- TG    05/29/24 1200 97.8 °F (36.6 °C) -- -- 127/79 -- -- -- -- TG    05/29/24 1128 -- -- -- -- -- 265 lb -- -- TG

## 2024-05-30 NOTE — PLAN OF CARE
Admitted to room 1112. ID bands verified by 2 Rns. Assessment and vitals completed. POC discussed with pt. Oriented to room and call light. All questions answered.    Problem: POSTPARTUM  Goal: Long Term Goal:Experiences normal postpartum course  Description: INTERVENTIONS:  - Assess and monitor vital signs and lab values.  - Assess fundus and lochia.  - Provide ice/sitz baths for perineum discomfort.  - Monitor healing of incision/episiotomy/laceration, and assess for signs and symptoms of infection and hematoma.  - Assess bladder function and monitor for bladder distention.  - Provide/instruct/assist with pericare as needed.  - Provide VTE prophylaxis as needed.  - Monitor bowel function.  - Encourage ambulation and provide assistance as needed.  - Assess and monitor emotional status and provide social service/psych resources as needed.  - Utilize standard precautions and use personal protective equipment as indicated. Ensure aseptic care of all intravenous lines and invasive tubes/drains.  - Obtain immunization and exposure to communicable diseases history.  Outcome: Progressing  Goal: Optimize infant feeding at the breast  Description: INTERVENTIONS:  - Initiate breast feeding within first hour after birth.   - Monitor effectiveness of current breast feeding efforts.  - Assess support systems available to mother/family.  - Identify cultural beliefs/practices regarding lactation, letdown techniques, maternal food preferences.  - Assess mother's knowledge and previous experience with breast feeding.  - Provide information as needed about early infant feeding cues (e.g., rooting, lip smacking, sucking fingers/hand) versus late cue of crying.  - Discuss/demonstrate breast feeding aids (e.g., infant sling, nursing footstool/pillows, and breast pumps).  - Encourage mother/other family members to express feelings/concerns, and actively listen.  - Educate father/SO about benefits of breast feeding and how to manage  common lactation challenges.  - Recommend avoidance of specific medications or substances incompatible with breast feeding.  - Assess and monitor for signs of nipple pain/trauma.  - Instruct and provide assistance with proper latch.  - Review techniques for milk expression (breast pumping) and storage of breast milk. Provide pumping equipment/supplies, instructions and assistance, as needed.  - Encourage rooming-in and breast feeding on demand.  - Encourage skin-to-skin contact.  - Provide LC support as needed.  - Assess for and manage engorgement.  - Provide breast feeding education handouts and information on community breast feeding support.   Outcome: Progressing  Goal: Establishment of adequate milk supply with medication/procedure interruptions  Description: INTERVENTIONS:  - Review techniques for milk expression (breast pumping).   - Provide pumping equipment/supplies, instructions, and assistance until it is safe to breastfeed infant.  Outcome: Progressing  Goal: Experiences normal breast weaning course  Description: INTERVENTIONS:  - Assess for and manage engorgement.  - Instruct on breast care.  - Provide comfort measures.  Outcome: Progressing  Goal: Appropriate maternal -  bonding  Description: INTERVENTIONS:  - Assess caregiver- interactions.  - Assess caregiver's emotional status and coping mechanisms.  - Encourage caregiver to participate in  daily care.  - Assess support systems available to mother/family.  - Provide /case management support as needed.  Outcome: Progressing

## 2024-05-30 NOTE — PROGRESS NOTES
Labor Analgesia Follow Up Note    Patient underwent epidural anesthesia for labor analgesia,    Placenta Date/Time: 5/29/2024  5:04 PM     Delivery Date/Time:: 5/29/2024   5:00 PM     /69 (BP Location: Right arm)   Pulse 85   Temp 98 °F (36.7 °C) (Oral)   Resp 16   Wt 120.2 kg (265 lb)   LMP 08/27/2023 (Approximate)   Breastfeeding Yes   BMI 43.43 kg/m²     Assessment:  Patient seen and no apparent anesthesia related complications.    Thank you for asking us to participate in the care of your patient.

## 2024-05-31 VITALS
HEART RATE: 73 BPM | WEIGHT: 265 LBS | SYSTOLIC BLOOD PRESSURE: 108 MMHG | RESPIRATION RATE: 18 BRPM | DIASTOLIC BLOOD PRESSURE: 73 MMHG | BODY MASS INDEX: 43 KG/M2 | TEMPERATURE: 97 F

## 2024-05-31 NOTE — DISCHARGE INSTRUCTIONS
Post Vaginal Delivery Home Care Instructions     Tatiana-  We hope you were pleased with your care at [facility].  We wish you the best outcome and overall experience with the delivery of your baby.  These instructions will help to minimize pain, limit the risk for an infection, and improve the likelihood of a successful recovery.    What to Expect:  Abdominal cramping after delivery especially if you are breastfeeding   Vaginal bleeding for about 4-6 weeks that may be followed by a yellow or white discharge for a few more weeks.  Your period will resume in approximately 6-8 weeks, unless you are breastfeeding    If you are bottle feeding, you may notice breast engorgement in about 3 days.  Your breast may be sore and hard. Please wear a tight fitted bra or sports bra for 24-36 hours to help prevent your breast from producing milk, and use ice packs to relive any discomfort  If you are breastfeeding, nipple dryness is very common the first few days    Constipation is common after having a baby, please increase fluid and fiber in your diet     Over-The-Counter Medication  Non-prescription anti-inflammatory medications can also help to ease the pain.  You may take Aleve, Tylenol or Ibuprofen   Colace or Metamucil for Constipation  Lanolin for dry nipples  Tucks, Witch Hazel and Epifoam for Episiotomy discomfort   Drink a full glass of water with oral medication and take as directed    Wound Care  The following instructions will promote proper healing and help to prevent infection  Episiotomy Care: Sitz Bath for 15mins, 2-3 times a day    Bathing/Showers  You may resume showers  No baths, swimming, hot tubs until your post-partum visit    Home Medication  Resume your home medications as instructed    Diet  Resume your normal diet    Activity  Refrain from vaginal intercourse, vaginal suppositories, tampon use or douches until after your post-partum visit  No exercising for 4 weeks  You may climb stairs minimally  for the first week    Do not do heavy housework for at least 2-3 weeks    Return to Work or School  You may return to work in approximately 6 weeks  Contact your obstetrician’s office, if you need a medical release form/letter    Driving  Avoid driving if you are taking narcotics for pain relief    Follow-up Appointment with Your Obstetrician  Call your obstetrician’s office today for an appointment in 4-6 weeks    Verify your appointment date, day, time, and location  At your first post-partum office visit, your progress will be evaluated, findings reviewed, and any additional concerns and instructions will be discussed    Questions or Concerns  Call your obstetrician’s office if you experience the following:  Severe pain not controlled by pain medication  Foul smelling vaginal discharge  Heavy bleeding  Shortness of breath  Fever  Redness, increased swelling or drainage from your incision  Crying and periods of sadness that prevents you from caring for yourself and your baby  Burning sensation during urination or inability to urinate  Swelling, redness or abnormal warmth to your leg/calf  If your call is made after office hours, a physician will be available to help you.  There is always a provider covering our patients    Tatiana  Thank you for coming to [facility] to start your new family.  The nurses and the anesthesiologists try very hard to make sure you receive the best care possible.  Your trust in them as well as us is greatly appreciated.    Thanks so much,   The [facility]

## 2024-05-31 NOTE — PLAN OF CARE
Problem: BIRTH - VAGINAL/ SECTION  Goal: Fetal and maternal status remain reassuring during the birth process  Description: INTERVENTIONS:  - Monitor vital signs  - Monitor fetal heart rate  - Monitor uterine activity  - Monitor labor progression (vaginal delivery)  - DVT prophylaxis (C/S delivery)  - Surgical antibiotic prophylaxis (C/S delivery)  Outcome: Completed     Problem: PAIN - ADULT  Goal: Verbalizes/displays adequate comfort level or patient's stated pain goal  Description: INTERVENTIONS:  - Encourage pt to monitor pain and request assistance  - Assess pain using appropriate pain scale  - Administer analgesics based on type and severity of pain and evaluate response  - Implement non-pharmacological measures as appropriate and evaluate response  - Consider cultural and social influences on pain and pain management  - Manage/alleviate anxiety  - Utilize distraction and/or relaxation techniques  - Monitor for opioid side effects  - Notify MD/LIP if interventions unsuccessful or patient reports new pain  - Anticipate increased pain with activity and pre-medicate as appropriate  Outcome: Completed     Problem: ANXIETY  Goal: Will report anxiety at manageable levels  Description: INTERVENTIONS:  - Administer medication as ordered  - Teach and rehearse alternative coping skills  - Provide emotional support with 1:1 interaction with staff  Outcome: Completed     Problem: Patient/Family Goals  Goal: Patient/Family Long Term Goal  Description: Patient's Long Term Goal:     Interventions:  -   - See additional Care Plan goals for specific interventions  Outcome: Completed  Goal: Patient/Family Short Term Goal  Description: Patient's Short Term Goal:     Interventions:     - See additional Care Plan goals for specific interventions  Outcome: Completed     Problem: POSTPARTUM  Goal: Long Term Goal:Experiences normal postpartum course  Description: INTERVENTIONS:  - Assess and monitor vital signs and lab  values.  - Assess fundus and lochia.  - Provide ice/sitz baths for perineum discomfort.  - Monitor healing of incision/episiotomy/laceration, and assess for signs and symptoms of infection and hematoma.  - Assess bladder function and monitor for bladder distention.  - Provide/instruct/assist with pericare as needed.  - Provide VTE prophylaxis as needed.  - Monitor bowel function.  - Encourage ambulation and provide assistance as needed.  - Assess and monitor emotional status and provide social service/psych resources as needed.  - Utilize standard precautions and use personal protective equipment as indicated. Ensure aseptic care of all intravenous lines and invasive tubes/drains.  - Obtain immunization and exposure to communicable diseases history.  Outcome: Completed  Goal: Optimize infant feeding at the breast  Description: INTERVENTIONS:  - Initiate breast feeding within first hour after birth.   - Monitor effectiveness of current breast feeding efforts.  - Assess support systems available to mother/family.  - Identify cultural beliefs/practices regarding lactation, letdown techniques, maternal food preferences.  - Assess mother's knowledge and previous experience with breast feeding.  - Provide information as needed about early infant feeding cues (e.g., rooting, lip smacking, sucking fingers/hand) versus late cue of crying.  - Discuss/demonstrate breast feeding aids (e.g., infant sling, nursing footstool/pillows, and breast pumps).  - Encourage mother/other family members to express feelings/concerns, and actively listen.  - Educate father/SO about benefits of breast feeding and how to manage common lactation challenges.  - Recommend avoidance of specific medications or substances incompatible with breast feeding.  - Assess and monitor for signs of nipple pain/trauma.  - Instruct and provide assistance with proper latch.  - Review techniques for milk expression (breast pumping) and storage of breast milk.  Provide pumping equipment/supplies, instructions and assistance, as needed.  - Encourage rooming-in and breast feeding on demand.  - Encourage skin-to-skin contact.  - Provide LC support as needed.  - Assess for and manage engorgement.  - Provide breast feeding education handouts and information on community breast feeding support.   Outcome: Completed  Goal: Establishment of adequate milk supply with medication/procedure interruptions  Description: INTERVENTIONS:  - Review techniques for milk expression (breast pumping).   - Provide pumping equipment/supplies, instructions, and assistance until it is safe to breastfeed infant.  Outcome: Completed  Goal: Experiences normal breast weaning course  Description: INTERVENTIONS:  - Assess for and manage engorgement.  - Instruct on breast care.  - Provide comfort measures.  Outcome: Completed  Goal: Appropriate maternal -  bonding  Description: INTERVENTIONS:  - Assess caregiver- interactions.  - Assess caregiver's emotional status and coping mechanisms.  - Encourage caregiver to participate in  daily care.  - Assess support systems available to mother/family.  - Provide /case management support as needed.  Outcome: Completed

## 2024-05-31 NOTE — DISCHARGE SUMMARY
[facility]  Discharge Summary    Tatiana Vargas Patient Status:  inpatient    1995 MRN [id number]   Location 1112/1112-A Attending [facility]   Hosp Day # 1 PCP NOHEMI SANCHES     Date of Admission: 2024    Date of Discharge: 24    Admitting Diagnosis: pregnancy  Pregnancy (HCC)    Discharge Diagnosis: s/p     Hospital Course: The patient is a 29 year old female now  who was admitted on 24 at 37wk2d for induction of labor secondary to oligohydramnios.  Pitocin was initiated. AROM. Epidural was placed for pain management per patient request. The patient progressed to complete. S/p  on 24. Please refer to delivery note for further details. PPD#1, the patient was doing well. She was ambulating and voiding without difficulty. She was tolerating a general diet. Pain was well controlled.  Plan for discharge on PPD#2, as long as the patient continued to do well and was meeting post partum expectations. She was discharged to home and instructed to follow up in 6 weeks.     Consultations: None    Procedures:     Complications: none    Discharge Condition: Stable    Discharge Medications: Current Discharge Medication List       Medication List        START taking these medications      docusate sodium 100 MG Caps  Commonly known as: COLACE  Take 100 mg by mouth 2 (two) times daily as needed for constipation.     ibuprofen 600 MG Tabs  Commonly known as: Motrin  Take 1 tablet (600 mg total) by mouth every 6 (six) hours as needed.            CONTINUE taking these medications      D3 VITAMIN OR     Iron 240 (27 Fe) MG Tabs     PRENATAL VITAMINS OR            STOP taking these medications      aspirin 81 MG Tbec     Magnesium 500 MG Tabs               Where to Get Your Medications        These medications were sent to [facility] [place], [place] - [address], [address] [phone], [phone]  [address] [place] [address], [place] [address], [facility] [address]      Phone:  [phone]   docusate sodium 100 MG Caps  ibuprofen 600 MG Tabs           Follow up Visits: Follow-up with [facility] in 6 weeks      Marielena Qureshi MD   [facility] - [facility]        Note to patient and family   The 21st Century Cures Act makes medical notes available to patients in the interest of transparency.  However, please be advised that this is a medical document.  It is intended as nbvr-mm-dicu communication.  It is written and medical language may contain abbreviations or verbiage that are technical and unfamiliar.  It may appear blunt or direct.  Medical documents are intended to carry relevant information, facts as evident, and the clinical opinion of the practitioner.

## 2024-05-31 NOTE — PROGRESS NOTES
Late entry     [facility]  Obstetrics and Gynecology    OB/GYN: Postpartum Progress Note     SUBJECTIVE:  Patient is a 29 year old  female who is s/p . She is PPD# 1.   Doing well.  Denies fever, chills, N, V, chest pain and SOB. Bleeding has been stable. Voiding without difficulty.  Passing flatus.  denies Bm. Tolerating general diet. Ambulating without difficulty. .     OBJECTIVE:  Vitals:    24 1900 24 1930 24 0750 24 1915   BP: 125/74 130/61 114/69 125/87   Pulse: 95 87 85 80   Resp: 16 18 16 16   Temp:  98.5 °F (36.9 °C) 98 °F (36.7 °C) 98.3 °F (36.8 °C)   TempSrc:  Oral Oral Oral   Weight:           Physical Exam:    General:    alert, appears stated age, cooperative, and no distress   Lochia:  appropriate   Uterine Fundus:   firm below umbilicus   Perineum:  healing well, no significant drainage, no dehiscence, no significant erythema    DVT Evaluation:  No evidence of DVT seen on physical exam.  Negative Pawel's sign.  No cords or calf tenderness.  No significant calf/ankle edema.     Urinary output is adequate.   I/O last 3 completed shifts:  In: 1323.1 [I.V.:323.1; IV PIGGYBACK:1000]  Out: 1246 [Urine:1000; Blood:246]      Labs:  Recent Labs   Lab 24  0536   RBC 3.40*   HGB 10.2*   HCT 29.5*   MCV 86.8   MCH 30.0   MCHC 34.6   RDW 14.8   NEPRELIM 6.33   WBC 10.4   .0       ASSESSMENT/PLAN:  Patient is a 29 year old  female who is s/p  PPD# 1.     Doing well   Continue routine postpartum care  Vitals per routine   Encourage ambulation   Plan for discharge to home tomorrow, plan for discharge in the morning and discussed with patient  Follow up in 6 weeks      Marielena Qureshi MD   [facility] - [facility]BG[facility]      Note to patient and family   The  Century Cures Act makes medical notes available to patients in the interest of transparency.  However, please be advised that this is a medical document.  It is intended as jvra-jd-kfje communication.  It is  written and medical language may contain abbreviations or verbiage that are technical and unfamiliar.  It may appear blunt or direct.  Medical documents are intended to carry relevant information, facts as evident, and the clinical opinion of the practitioner.

## 2024-06-03 ENCOUNTER — TELEPHONE (OUTPATIENT)
Dept: OBGYN UNIT | Facility: HOSPITAL | Age: 29
End: 2024-06-03

## 2024-06-05 ENCOUNTER — PATIENT OUTREACH (OUTPATIENT)
Dept: CASE MANAGEMENT | Age: 29
End: 2024-06-05

## 2024-06-05 NOTE — PROGRESS NOTES
OBGYN Post Partum apt request (delivered 05/29)    Dr Elizabeth Abbott  [facility]  [address] [place] [id number] [phone]  Follow up 6 weeks  Apt made:  Wed 07/10 @11:00am w/Dr Elizabeth Abbott  Confirmed w/pt  Closing encounter

## 2024-07-10 ENCOUNTER — POSTPARTUM (OUTPATIENT)
Facility: CLINIC | Age: 29
End: 2024-07-10
Payer: COMMERCIAL

## 2024-07-10 VITALS
HEIGHT: 65.5 IN | SYSTOLIC BLOOD PRESSURE: 108 MMHG | BODY MASS INDEX: 39.34 KG/M2 | DIASTOLIC BLOOD PRESSURE: 68 MMHG | HEART RATE: 83 BPM | WEIGHT: 239 LBS

## 2024-07-10 NOTE — PROGRESS NOTES
HPI    Tatiana Vargas is a 29 year old female  here for 6 week post-partum visit.  Patient delivered a  female infant on 24 via .  Patient desires nothing for contraception.  Patient is breast feeding.   Patient denies symptoms of depression, [place]  depression scale score of 1 out of 30.     [place]  DEPRESSION SCALE  I have been able to laugh and see the funny side of things: As much as I always could  I have looked forward with enjoyment to things: As much as I ever did  I have been anxious or worried for no good reason: No, not at all  I have felt scared or panicky for no good reason: No, not at all  Things have been getting on top of me: No, I have been coping as well as ever  I have been so unhappy that I have had difficulty sleeping: Not at all  I have felt sad or miserable: No, not at all  I have been so unhappy that I have been crying: No, never  The thought of harming myself has occurred to me: Never  Total: 1    OBSTETRIC HISTORY  OB History    Para Term  AB Living   4 3 3     3   SAB IAB Ectopic Multiple Live Births         0 3      # Outcome Date GA Lbr Odell/2nd Weight Sex Type Anes PTL Lv   4 Term 24 37w2d 01:36 / 00:02 6 lb 9.8 oz (3 kg) F NORMAL SPONT EPI N RICH      Complications: Oligohydramnios   3 Term 20 39w1d / 00:09 7 lb 3 oz (3.26 kg) M NORMAL SPONT EPI N RICH   2 Term 03/13/15   8 lb (3.629 kg) M Vag-Spont   RICH   1                Obstetric Comments   Current - morbid obesity, early onset GDM dx 16 wk, h/o elevated LFTs (baseline LFTs normal at 16 wk), anxiety, depression, panic attacks        GYNE HISTORY        MEDICATIONS:    Current Outpatient Medications:     docusate sodium 100 MG Oral Cap, Take 100 mg by mouth 2 (two) times daily as needed for constipation., Disp: 30 capsule, Rfl: 0    Cholecalciferol (D3 VITAMIN OR), , Disp: , Rfl:     Prenatal Multivit-Min-Fe-FA (PRENATAL VITAMINS OR), Take by mouth., Disp: ,  Rfl:     Ferrous Gluconate (IRON) 240 (27 Fe) MG Oral Tab, 4 (four) times a week. (Patient not taking: Reported on 7/10/2024), Disp: , Rfl:     ALLERGIES:    Allergies   Allergen Reactions    Cat Hair Extract HIVES, Runny nose and UNKNOWN    Dander HIVES     Cat and Dog Dander    Shellfish Allergy ANXIETY, Coughing, OTHER (SEE COMMENTS), HIVES, RASH and SHORTNESS OF BREATH    Shrimp HIVES and WHEEZING    Shrimp Extract Allergy Skin Test ANAPHYLAXIS, HIVES and SHORTNESS OF BREATH       PHYSICAL EXAM  Blood pressure 108/68, pulse 83, height 65.5\", weight 239 lb (108.4 kg), last menstrual period 08/22/2023, currently breastfeeding.  General:  Well nourished, well developed woman in no acute distress  Abdomen:  soft, nontender, no masses  External Genitalia: normal appearance, hair distribution, and no lesions  Vagina:  Normal appearance without lesions, no abnormal discharge  Cervix:  Normal without tenderness on motion  Uterus: normal in size, contour, position, mobility, without tenderness  Adnexa: normal without masses or tenderness  Perineum: well healed perineum  Anus: no hemorroids       ASSESSMENT/PLAN  Tatiana was seen today for postpartum care.    Diagnoses and all orders for this visit:    Postpartum exam (HCC)      Discussed all options of birthcontrol including ocps, minipill, Mirena or Paragard IUD, nuvaring, orthoevra patch, nexplanon, Depoprovera, condoms or tubal sterilization options. Patient has chosen condom.  Patient to return for annual gyne exam in December.

## 2024-07-19 ENCOUNTER — TELEPHONE (OUTPATIENT)
Facility: CLINIC | Age: 29
End: 2024-07-19

## 2024-07-19 ENCOUNTER — APPOINTMENT (OUTPATIENT)
Dept: CT IMAGING | Age: 29
End: 2024-07-19
Attending: EMERGENCY MEDICINE
Payer: COMMERCIAL

## 2024-07-19 ENCOUNTER — HOSPITAL ENCOUNTER (EMERGENCY)
Age: 29
Discharge: HOME | End: 2024-07-19
Attending: EMERGENCY MEDICINE
Payer: COMMERCIAL

## 2024-07-19 VITALS
SYSTOLIC BLOOD PRESSURE: 102 MMHG | WEIGHT: 239 LBS | HEART RATE: 64 BPM | TEMPERATURE: 98 F | OXYGEN SATURATION: 99 % | HEIGHT: 65 IN | BODY MASS INDEX: 39.82 KG/M2 | DIASTOLIC BLOOD PRESSURE: 76 MMHG | RESPIRATION RATE: 16 BRPM

## 2024-07-19 DIAGNOSIS — G43.809 OTHER MIGRAINE WITHOUT STATUS MIGRAINOSUS, NOT INTRACTABLE: Primary | ICD-10-CM

## 2024-07-19 LAB
ANION GAP SERPL CALC-SCNC: 3 MMOL/L (ref 0–18)
B-HCG UR QL: NEGATIVE
BUN BLD-MCNC: 14 MG/DL (ref 9–23)
CALCIUM BLD-MCNC: 9.6 MG/DL (ref 8.5–10.1)
CHLORIDE SERPL-SCNC: 107 MMOL/L (ref 98–112)
CO2 SERPL-SCNC: 28 MMOL/L (ref 21–32)
CREAT BLD-MCNC: 0.77 MG/DL
EGFRCR SERPLBLD CKD-EPI 2021: 108 ML/MIN/1.73M2 (ref 60–?)
GLUCOSE BLD-MCNC: 98 MG/DL (ref 70–99)
OSMOLALITY SERPL CALC.SUM OF ELEC: 286 MOSM/KG (ref 275–295)
POTASSIUM SERPL-SCNC: 4 MMOL/L (ref 3.5–5.1)
SODIUM SERPL-SCNC: 138 MMOL/L (ref 136–145)

## 2024-07-19 PROCEDURE — 96375 TX/PRO/DX INJ NEW DRUG ADDON: CPT

## 2024-07-19 PROCEDURE — 70450 CT HEAD/BRAIN W/O DYE: CPT | Performed by: EMERGENCY MEDICINE

## 2024-07-19 PROCEDURE — 96374 THER/PROPH/DIAG INJ IV PUSH: CPT

## 2024-07-19 PROCEDURE — 80048 BASIC METABOLIC PNL TOTAL CA: CPT | Performed by: EMERGENCY MEDICINE

## 2024-07-19 PROCEDURE — 99284 EMERGENCY DEPT VISIT MOD MDM: CPT

## 2024-07-19 PROCEDURE — 96361 HYDRATE IV INFUSION ADD-ON: CPT

## 2024-07-19 PROCEDURE — 81025 URINE PREGNANCY TEST: CPT

## 2024-07-19 RX ORDER — ONDANSETRON 4 MG/1
4 TABLET, ORALLY DISINTEGRATING ORAL EVERY 4 HOURS PRN
Qty: 10 TABLET | Refills: 0 | Status: SHIPPED | OUTPATIENT
Start: 2024-07-19 | End: 2024-07-26

## 2024-07-19 RX ORDER — KETOROLAC TROMETHAMINE 30 MG/ML
30 INJECTION, SOLUTION INTRAMUSCULAR; INTRAVENOUS ONCE
Status: DISCONTINUED | OUTPATIENT
Start: 2024-07-19 | End: 2024-07-19

## 2024-07-19 RX ORDER — SODIUM CHLORIDE 9 MG/ML
INJECTION, SOLUTION INTRAVENOUS ONCE
Status: COMPLETED | OUTPATIENT
Start: 2024-07-19 | End: 2024-07-19

## 2024-07-19 RX ORDER — MECLIZINE HYDROCHLORIDE 25 MG/1
25 TABLET ORAL 3 TIMES DAILY PRN
Qty: 12 TABLET | Refills: 0 | Status: SHIPPED | OUTPATIENT
Start: 2024-07-19

## 2024-07-19 RX ORDER — DIPHENHYDRAMINE HYDROCHLORIDE 50 MG/ML
25 INJECTION INTRAMUSCULAR; INTRAVENOUS ONCE
Status: COMPLETED | OUTPATIENT
Start: 2024-07-19 | End: 2024-07-19

## 2024-07-19 RX ORDER — METOCLOPRAMIDE HYDROCHLORIDE 5 MG/ML
5 INJECTION INTRAMUSCULAR; INTRAVENOUS ONCE
Status: COMPLETED | OUTPATIENT
Start: 2024-07-19 | End: 2024-07-19

## 2024-07-19 NOTE — TELEPHONE ENCOUNTER
Patient calling to speak to a nurse about headaches and dizzy spells. Patient had her post partum visit on 7/10/24.  Please advise.

## 2024-07-19 NOTE — DISCHARGE INSTRUCTIONS
Rest at home  Please pump and waste breast milk for next 2 days at home  Return to the ER if symptoms worsen or if any other problems arise

## 2024-07-19 NOTE — TELEPHONE ENCOUNTER
Spoke with patient. Patient states she had a migraine headache last night and woke up dizzy. Today she is not experiencing headaches but dizziness that occurs about every 5 minutes. Patient states sleeping well, staying hydrated, and diet is good. There was a moment she felt overwhelmed for a moment last night but feels much better today outside of her dizzy spells. Asked her if she felt like she was going to harm herself or the baby. Patient stated no.     Per Dr. Abbott, reach out to PRIMARY CARE PROVIDER or go to an urgent care. Patient verbalized understanding.

## 2024-07-19 NOTE — ED PROVIDER NOTES
Patient Seen in: [facility] In [place]      History     Chief Complaint   Patient presents with    Dizziness    Nausea/Vomiting/Diarrhea     Stated Complaint: dizzy, had migraine yesterday    Subjective:   HPI    Patient is a 29-year-old female presents emergency room with a history of a migraine headache which started yesterday and some dizziness today.  The patient states she felt dizzy like the room was spinning around.  The patient had 1 episode of emesis.  The patient does have a history of migraine headaches over the last couple of years.  The patient denies history of any fall or trauma.  The patient denies fever.  The patient denies weakness or numbness to the arms or legs.  There is a strong family history for migraine headaches as well.    Objective:   Past Medical History:    Abnormal LFTs    LFTs normal 1/6/24 at 16 wk gestation    Anemia    Anxiety    Anxiety with depression    Contact dermatitis, unspecified contact dermatitis type, unspecified trigger    Depression    Gestational diabetes (HCC)    early onset - dx 16 wk with elevated A1c    LGSIL on Pap smear of cervix    Migraines    Morbid obesity with BMI of 40.0-44.9, adult (HCC)    Morbid obesity with BMI of 45.0-49.9, adult (HCC)    Panic attack    Screening for genetic disease carrier status    [employer] Carrier Screen = Negative    Smoker    Vitamin D deficiency              Past Surgical History:   Procedure Laterality Date    Cholecystectomy  2012    Removal gallbladder  01/01/2012                Social History     Socioeconomic History    Marital status:    Tobacco Use    Smoking status: Never    Smokeless tobacco: Never   Vaping Use    Vaping status: Never Used   Substance and Sexual Activity    Alcohol use: Not Currently     Comment: Not while pregnant     Drug use: Not Currently     Types: Cannabis     Comment: Former- 04/2019     Sexual activity: Yes     Partners: Male   Other Topics Concern    Caffeine  Concern Yes    Exercise Yes    Seat Belt No    Special Diet Yes    Stress Concern No    Weight Concern Yes     Social Determinants of Health     Financial Resource Strain: Low Risk  (5/29/2024)    Financial Resource Strain     Difficulty of Paying Living Expenses: Not hard at all     Med Affordability: No   Food Insecurity: No Food Insecurity (5/29/2024)    Food Insecurity     Food Insecurity: Never true   Transportation Needs: No Transportation Needs (5/29/2024)    Transportation Needs     Lack of Transportation: No     Car Seat: Yes   Stress: No Stress Concern Present (5/29/2024)    Stress     Feeling of Stress : No   Housing Stability: Low Risk  (5/29/2024)    Housing Stability     Housing Instability: No              Review of Systems    Positive for stated Chief Complaint: Dizziness and Nausea/Vomiting/Diarrhea    Other systems are as noted in HPI.  Constitutional and vital signs reviewed.      All other systems reviewed and negative except as noted above.    Physical Exam     ED Triage Vitals [07/19/24 1240]   /83   Pulse 78   Resp 18   Temp 97.9 °F (36.6 °C)   Temp src Temporal   SpO2 98 %   O2 Device None (Room air)       Current Vitals:   Vital Signs  BP: 102/76  Pulse: 64  Resp: 16  Temp: 97.9 °F (36.6 °C)  Temp src: Temporal    Oxygen Therapy  SpO2: 99 %  O2 Device: None (Room air)            Physical Exam    GENERAL: Well-developed, well-nourished female Sitting up breathing easily in no apparent distress.  Patient is nontoxic in appearance.  HEENT: Head is normocephalic, atraumatic. Pupils are 4 mm equally round and reactive to light. Oropharynx is clear. Mucous membranes are moist.  Tympanic membranes are negative bilaterally.  NECK: There is no focal tenderness to palpation appreciated. No stridor.  LUNGS: Clear to auscultation bilaterally with no wheeze. There is good equal air entry bilaterally.  HEART: Regular rate and rhythm. Normal S1, S2 no S3, or S4. No murmur.  ABDOMEN: There is no   focal tenderness to palpation appreciated anywhere throughout the abdomen. There is no guarding, no rebound, no mass, and no organomegaly appreciated. There is normoactive bowel sounds. There is no hernia.  EXTREMITIES: There is no cyanosis, clubbing, or edema appreciated. Pulses are 2+ and equal in all 4 extremities.  NEURO: Patient is awake, alert and oriented to time place and person. Motor strength is 5 over 5 in all 4 extremities. There are no gross motor or sensory deficits appreciated. Cranial nerves II through XII are intact.  Patient answering all questions appropriately.        ED Course     Labs Reviewed   BASIC METABOLIC PANEL (8) - Normal   POCT PREGNANCY URINE - Normal     I personally reviewed the patient's CT scan of the brain and my individual interpretation of the images shows no evidence of any acute intracerebral bleed.  I also reviewed the official radiology report which showed results as noted below.          CT BRAIN OR HEAD (96331)    Result Date: 7/19/2024  CONCLUSION:  Negative for acute intracranial process.    LOCATION:  [facility]   Dictated by (CST): Danika Madrigal MD on 7/19/2024 at 3:12 PM     Finalized by (CST): Danika Madrigal MD on 7/19/2024 at 3:13 PM               [facility]          13:55  Patient is refusing toradol here in the ER.  Will continue to observe at this time.  15:33 patient states she is feeling much better at this time.  Patient with no other new complaints this time.  Patient has been instructed to pump and waste her breastmilk at this time.  The patient be discharged home at this time.      Patient had a pregnancy test found to be negative.  The patient and patient metabolic profile found to be unremarkable here in the ER.  Patient underwent CT scan with results as noted above.  The patient is sitting back and breathing easily in no apparent distress this time she was offered pain medication which she declined at this time was given Reglan and Benadryl for symptoms here in the  emergency room with significant improvement in symptoms after these were given.  Patient had no further new complaints throughout the rest of the emergency room stay.  Patient be discharged home at this time with instructions to pump and waste breastmilk for the next 2 days at home return to the ER immediately if symptoms worsen or if any other problems arise.  Patient discharged home at this time with improvement in symptoms and no further complaints.                           Medical Decision Making      Disposition and Plan     Clinical Impression:  1. Other migraine without status migrainosus, not intractable         Disposition:  Discharge  7/19/2024  3:35 pm    Follow-up:  Jose G Novak  [address]  [address]  [facility] [address]  [phone]    Follow up in 2 day(s)  please call for follow up this week          Medications Prescribed:  Current Discharge Medication List        START taking these medications    Details   meclizine 25 MG Oral Tab Take 1 tablet (25 mg total) by mouth 3 (three) times daily as needed.  Qty: 12 tablet, Refills: 0      ondansetron 4 MG Oral Tablet Dispersible Take 1 tablet (4 mg total) by mouth every 4 (four) hours as needed for Nausea.  Qty: 10 tablet, Refills: 0

## 2024-07-19 NOTE — ED QUICK NOTES
Pt declines toradol at this time, pt states she has no pain. Pt is unable to provide urine sample at this time.

## 2025-02-18 ENCOUNTER — APPOINTMENT (OUTPATIENT)
Dept: GENERAL RADIOLOGY | Age: 30
End: 2025-02-18
Payer: COMMERCIAL

## 2025-02-18 ENCOUNTER — HOSPITAL ENCOUNTER (EMERGENCY)
Age: 30
Discharge: HOME | End: 2025-02-18
Attending: EMERGENCY MEDICINE
Payer: COMMERCIAL

## 2025-02-18 VITALS
BODY MASS INDEX: 38.32 KG/M2 | OXYGEN SATURATION: 99 % | HEIGHT: 65 IN | HEART RATE: 80 BPM | WEIGHT: 230 LBS | DIASTOLIC BLOOD PRESSURE: 92 MMHG | TEMPERATURE: 98 F | RESPIRATION RATE: 16 BRPM | SYSTOLIC BLOOD PRESSURE: 142 MMHG

## 2025-02-18 DIAGNOSIS — S92.515A CLOSED NONDISPLACED FRACTURE OF PROXIMAL PHALANX OF LESSER TOE OF LEFT FOOT, INITIAL ENCOUNTER: Primary | ICD-10-CM

## 2025-02-18 PROCEDURE — 99283 EMERGENCY DEPT VISIT LOW MDM: CPT

## 2025-02-18 PROCEDURE — 28510 TREATMENT OF TOE FRACTURE: CPT

## 2025-02-18 PROCEDURE — 73630 X-RAY EXAM OF FOOT: CPT

## 2025-02-18 NOTE — ED INITIAL ASSESSMENT (HPI)
To ER for eval of pain to the left little toe with bruising noted,after she slipped an fell last night. Doesn't think she struck it on anything. Also has right posterior shoulder pain. NO head injury or syncope occurred. Tylenol @ 1000.

## 2025-02-18 NOTE — DISCHARGE INSTRUCTIONS
Rest, ice and elevate as much as possible over the next few days.   Wear the post-op shoe and buddy tape your toes until you see podiatry.    Take Tylenol and/or ibuprofen as needed for pain control.   Follow up with Dr. Resendez in 1-2 weeks for podiatry.     Thank you for choosing [facility] for your care.

## 2025-02-19 ENCOUNTER — TELEPHONE (OUTPATIENT)
Facility: LOCATION | Age: 30
End: 2025-02-19

## 2025-02-19 NOTE — TELEPHONE ENCOUNTER
Spoke with patient and she states on 2/17/25 and she was wearing socks on hardwood floor and slipped and injured left 5th toe. Patient went to ER on 2/18/25 and had Xray. Patient in PO shoe and andrew taped toes. Offered appointment today at [facility] but she declined due to work. Appointment scheduled on 2/25 at 145pm with Dr Resendez in [place]. Address provided.

## 2025-02-19 NOTE — ED PROVIDER NOTES
Patient Seen in: [facility] In [place]      History     Chief Complaint   Patient presents with    Leg or Foot Injury     Stated Complaint: slip and fall last noc  continued left foot pain    Subjective:   29-year-old female presents to the emergency department with complaint of toe injury.  Patient states she slipped and fell last night and injured her left little toe.  She has been having bruising and pain to the toe since this happened.  Patient states she did take some Tylenol earlier today to help with the pain.  She is unsure of how she fell that caused the injury.  She denies any other injury, numbness, or tingling.    The history is provided by the patient.         Objective:     Past Medical History:    Abnormal LFTs    LFTs normal 1/6/24 at 16 wk gestation    Anemia    Anxiety    Anxiety with depression    Contact dermatitis, unspecified contact dermatitis type, unspecified trigger    Depression    Gestational diabetes (HCC)    early onset - dx 16 wk with elevated A1c    LGSIL on Pap smear of cervix    Migraines    Morbid obesity with BMI of 40.0-44.9, adult (HCC)    Morbid obesity with BMI of 45.0-49.9, adult (HCC)    Panic attack    Screening for genetic disease carrier status    [employer] Carrier Screen = Negative    Smoker    Vitamin D deficiency              Past Surgical History:   Procedure Laterality Date    Cholecystectomy  2012    Removal gallbladder  01/01/2012                Social History     Socioeconomic History    Marital status:    Tobacco Use    Smoking status: Never    Smokeless tobacco: Never   Vaping Use    Vaping status: Never Used   Substance and Sexual Activity    Alcohol use: Not Currently     Comment: Not while pregnant     Drug use: Not Currently     Types: Cannabis     Comment: Former- 04/2019     Sexual activity: Yes     Partners: Male   Other Topics Concern    Caffeine Concern Yes    Exercise Yes    Seat Belt No    Special Diet Yes    Stress Concern  No    Weight Concern Yes     Social Drivers of Health     Food Insecurity: Not on File (9/26/2024)    Received from [employer]    Food Insecurity     Food: 0   Transportation Needs: No Transportation Needs (5/29/2024)    Transportation Needs     Lack of Transportation: No     Car Seat: Yes   Stress: No Stress Concern Present (5/29/2024)    Stress     Feeling of Stress : No   Housing Stability: Low Risk  (5/29/2024)    Housing Stability     Housing Instability: No                  Physical Exam     ED Triage Vitals   BP 02/18/25 1718 (!) 142/92   Pulse 02/18/25 1718 80   Resp 02/18/25 1718 16   Temp 02/18/25 1718 97.5 °F (36.4 °C)   Temp src 02/18/25 1718 Skin   SpO2 02/18/25 1719 99 %   O2 Device 02/18/25 1719 None (Room air)       Current Vitals:   Vital Signs  BP: (!) 142/92  Pulse: 80  Resp: 16  Temp: 97.5 °F (36.4 °C)  Temp src: Skin    Oxygen Therapy  SpO2: 99 %  O2 Device: None (Room air)        Physical Exam  Vitals and nursing note reviewed.   Constitutional:       General: She is not in acute distress.     Appearance: Normal appearance. She is normal weight. She is not ill-appearing.   HENT:      Head: Normocephalic and atraumatic.   Eyes:      Conjunctiva/sclera: Conjunctivae normal.   Cardiovascular:      Rate and Rhythm: Normal rate and regular rhythm.      Pulses: Normal pulses.      Heart sounds: Normal heart sounds.   Pulmonary:      Effort: Pulmonary effort is normal. No respiratory distress.      Breath sounds: Normal breath sounds.   Musculoskeletal:         General: Swelling, tenderness and signs of injury present. No deformity. Normal range of motion.   Feet:      Comments: Tenderness, edema and ecchymosis to the left 5th proximal phalanx and MTP joint.  No obvious deformity or dislocation.  ROM, motor strength, and sensation intact.  No pain with palpation of the mid to proximal fifth metatarsal.  Cap refill less than 2 seconds and DP is 2+.  Skin:     General: Skin is warm and dry.      Capillary  Refill: Capillary refill takes less than 2 seconds.   Neurological:      General: No focal deficit present.      Mental Status: She is alert and oriented to person, place, and time.   Psychiatric:         Mood and Affect: Mood normal.         Behavior: Behavior normal.           ED Course   Labs Reviewed - No data to display    ED Course as of 02/18/25 1808  ------------------------------------------------------------  Time: 02/18 1742  Value: XR FOOT, COMPLETE (MIN 3 VIEWS), LEFT (CPT=73630)  Comment: Impression  CONCLUSION:    1. Transverse nondisplaced extra-articular fracture involves the base of the 5th proximal phalanx as detailed above.          [facility]        Medical Decision Making  29-year-old female with toe injury that occurred yesterday.  X-ray ordered.    X-rays read by radiology shows a closed nondisplaced fracture of the left fifth proximal phalanx.  Will andrew tape patient and place her in a postop shoe for immobilization.  Referral given for podiatry for follow-up.  No evidence of neurovascular compromise.  Patient can take Tylenol, ibuprofen, and ice to help with pain control.    Amount and/or Complexity of Data Reviewed  Radiology: ordered. Decision-making details documented in ED Course.    Risk  OTC drugs.        Disposition and Plan     Clinical Impression:  1. Closed nondisplaced fracture of proximal phalanx of lesser toe of left foot, initial encounter         Disposition:  Discharge  2/18/2025  5:50 pm    Follow-up:  Kyle Resendez DPM  [address] [place] [id number]  [phone]    Follow up in 1 week(s)            Medications Prescribed:  Current Discharge Medication List              Supplementary Documentation:

## 2025-02-19 NOTE — TELEPHONE ENCOUNTER
Patient was in urgent care yesterday 2/18 for fractured toe  on left foot. No appointment available this week. Please call.

## 2025-02-25 ENCOUNTER — OFFICE VISIT (OUTPATIENT)
Facility: LOCATION | Age: 30
End: 2025-02-25
Payer: COMMERCIAL

## 2025-02-25 DIAGNOSIS — S93.402A SPRAIN OF LEFT ANKLE, UNSPECIFIED LIGAMENT, INITIAL ENCOUNTER: ICD-10-CM

## 2025-02-25 DIAGNOSIS — M79.675 TOE PAIN, LEFT: ICD-10-CM

## 2025-02-25 DIAGNOSIS — S92.515A CLOSED NONDISPLACED FRACTURE OF PROXIMAL PHALANX OF LESSER TOE OF LEFT FOOT, INITIAL ENCOUNTER: Primary | ICD-10-CM

## 2025-02-25 DIAGNOSIS — R60.0 EDEMA OF TOE: ICD-10-CM

## 2025-02-25 DIAGNOSIS — M25.572 ACUTE LEFT ANKLE PAIN: ICD-10-CM

## 2025-02-25 DIAGNOSIS — M25.472 EDEMA OF LEFT ANKLE: ICD-10-CM

## 2025-02-25 PROCEDURE — 28510 TREATMENT OF TOE FRACTURE: CPT | Performed by: PODIATRIST

## 2025-02-25 PROCEDURE — 99203 OFFICE O/P NEW LOW 30 MIN: CPT | Performed by: PODIATRIST

## 2025-02-25 NOTE — PROGRESS NOTES
[facility]  Progress Note      Tatiana Vargas is a 29 year old female.   Chief Complaint   Patient presents with    New Patient     Patient slipped in her kitchen on 2/17/25. Xrays taken on 2/18/25, placed in surgical shoe. She rates pain 2/10, she does get  some numbness and tingling at times. Little swelling, minimal bruising.         HPI:     Patient is a pleasant 29-year-old female who is presenting to clinic today for a left toe and ankle injury.  Patient states that she tripped in her kitchen on 2/17/2025.  She is unable to describe the mechanism of injury.  She did go to urgent care due to left fifth digit and left lateral ankle pain.  Patient was diagnosed with a nondisplaced fracture to the proximal phalanx of her left fifth digit and was provided with a postop shoe.  She relates 2/10 pain and has some tingling and numbness.  She has noticed some swelling to the toe and outside the ankle with minimal amount of bruising.  Patient is able to ambulate without concerns currently.  No other concerns at this time.      Allergies: Cat hair extract, Dander, Shellfish allergy, Shrimp, and Shrimp extract allergy skin test   Current Outpatient Medications   Medication Sig Dispense Refill    meclizine 25 MG Oral Tab Take 1 tablet (25 mg total) by mouth 3 (three) times daily as needed. 12 tablet 0    docusate sodium 100 MG Oral Cap Take 100 mg by mouth 2 (two) times daily as needed for constipation. 30 capsule 0    Ferrous Gluconate (IRON) 240 (27 Fe) MG Oral Tab 4 (four) times a week.      Cholecalciferol (D3 VITAMIN OR)       Prenatal Multivit-Min-Fe-FA (PRENATAL VITAMINS OR) Take by mouth.        Past Medical History:    Abnormal LFTs    LFTs normal 1/6/24 at 16 wk gestation    Anemia    Anxiety    Anxiety with depression    Contact dermatitis, unspecified contact dermatitis type, unspecified trigger    Depression    Gestational diabetes (HCC)    early onset - dx 16 wk with elevated A1c    LGSIL on  Pap smear of cervix    Migraines    Morbid obesity with BMI of 40.0-44.9, adult (HCC)    Morbid obesity with BMI of 45.0-49.9, adult (HCC)    Panic attack    Screening for genetic disease carrier status    [employer] Carrier Screen = Negative    Smoker    Vitamin D deficiency      Past Surgical History:   Procedure Laterality Date    Cholecystectomy  2012    Removal gallbladder  01/01/2012      Family History   Problem Relation Age of Onset    Ovarian Cancer Mother 30        had cancerous cysts. Had blood draws for a long time. no chemotherapy    Hypertension Father     Asthma Sister     Diabetes Paternal Grandmother     Diabetes Paternal Grandfather     Other (ovarian cysts) Maternal Aunt     Thyroid disease Neg     Birth Defects Neg     Genetic Disease Neg     Breast Cancer Neg     Colon Cancer Neg       Social History     Socioeconomic History    Marital status:    Tobacco Use    Smoking status: Never    Smokeless tobacco: Never   Vaping Use    Vaping status: Never Used   Substance and Sexual Activity    Alcohol use: Not Currently     Comment: Not while pregnant     Drug use: Not Currently     Types: Cannabis     Comment: Former- 04/2019     Sexual activity: Yes     Partners: Male   Other Topics Concern    Caffeine Concern Yes    Exercise Yes    Seat Belt No    Special Diet Yes    Stress Concern No    Weight Concern Yes           REVIEW OF SYSTEMS:     10 point ROS completed and was negative unless stated in HPI.      EXAM:     Left lower extremity focused exam:  GENERAL: well developed, well nourished, in no apparent distress  EXTREMITIES:  1. Integument: Skin appears moist, warm, and supple.  Very mild ecchymosis to dorsal fifth MPJ. No open lesions. No macerations. No Hyperkeratotic lesions.   2. Vascular: Dorsalis pedis 2/4 and posterior tibial pulse 2/4, capillary refill normal.  Localized edema noted to left fifth digit and lateral ankle  3. Neurological: Gross sensation intact via light touch  bilaterally.  Normal sharp/dull sensation  4. Musculoskeletal: Very mild discomfort with palpation to anterior lateral ankle ligaments.  Negative anterior drawer with no pain elicited.  Patient does have pain with palpation overlying the proximal phalanx of the left fifth digit.  No pain with palpation to course of peroneal tendons.  There is no acute deformities noted to the left lower extremity.  Patient able to actively move all digits and ankle with no restrictions.     XR FOOT, COMPLETE (MIN 3 VIEWS), LEFT (CPT=73630)    Result Date: 2/18/2025  CONCLUSION:  1. Transverse nondisplaced extra-articular fracture involves the base of the 5th proximal phalanx as detailed above.   LOCATION:  [facility]   Dictated by (CST): Tequila Easley MD on 2/18/2025 at 5:35 PM     Finalized by (CST): Tequila Easley MD on 2/18/2025 at 5:36 PM          ASSESSMENT AND PLAN:   Diagnoses and all orders for this visit:    Closed nondisplaced fracture of proximal phalanx of lesser toe of left foot, initial encounter    Sprain of left ankle, unspecified ligament, initial encounter    Edema of toe    Edema of left ankle    Toe pain, left    Acute left ankle pain        Plan:   -Patient was seen and evaluated today in clinic.  Chart history reviewed.    I did review patient's most recent radiographs, revealing a transverse nondisplaced extra-articular fracture of the base of the fifth proximal phalanx, left foot.  No other acute osseous abnormalities noted.    Discussed clinical and radiographic findings with patient.  She does appear to have had a mild ankle sprain, which she states has improved.  Minimal pain to the lateral ankle ligament complex and no pain to the peroneal tendons.    Recommend patient continue ambulating as tolerated in postop shoe for the next 1 week, transitioning as tolerated into supportive shoe gear.  Recommend against barefoot walking.    In regards to patient's left ankle, recommend resting, icing, and elevating.  Patient  can also utilize over-the-counter ankle brace if needed.    Patient should avoid any activities that elicits increasing pain.    -All of the patient's questions and concerns were addressed.  They indicated their understanding of these issues and agrees to the plan.    RTC 6-8 weeks    Rohit Resendez DPM, AACFAS        2/25/2025    [facility]  [address] [place] [id number]   [email or web address]            [employer] speech recognition software was used to prepare this note.  Errors in word recognition may occur.  Please contact me with any questions/concerns with this note.

## 2025-06-23 ENCOUNTER — TELEPHONE (OUTPATIENT)
Facility: CLINIC | Age: 30
End: 2025-06-23

## 2025-06-23 NOTE — TELEPHONE ENCOUNTER
29 yo spotting in between period for 2 months.   Still breastfeeding and taking PNV.     Last seen 7/10/24 for postpartum visit.     Appointment scheduled for 7/17/25. Patient verbalized understanding, agreed to and intend to comply with plan of care.

## (undated) NOTE — LETTER
Dear new mom:    We've missed you! The nurses of [facility] have tried to reach you by phone to ask if you had any questions regarding your health or the care of your new little one.     Please feel free to call your doctor with an

## (undated) NOTE — LETTER
Dear New MomLeonardo, we missed you! The nurses of [facility] have tried to reach you by phone to ask if you have any questions regarding your health or the health and care of your new little one.    We hope you are doing well. If, for any reason, you have questions or concerns about your health or your baby’s health, please contact your provider or your pediatrician or family medicine physician regarding your baby.     At [facility], we feel that postpartum support is very important for new families. Please see the enclosed new parent support flyer that lists support programs and resources with both in-person and online options.     Additionally, our Breastfeeding Centers at [facility] and [facility] in [place], offer outpatient visits with our International Board-Certified Lactation   Consultants (IBCLCs) for any breastfeeding concerns or questions you may have.    For issues related to stress, anxiety or depression, we have a Nurturing Mom support group that meets both in-person or online.  There’s also a 24-hour Mom’s Line where you can request a phone call from a clinical therapist for assistance for postpartum depression.    We encourage you to take advantage of these programs and resources as you recover from childbirth and learn to care for your new infant.    Best wishes,    [facility]            [id number]